# Patient Record
Sex: FEMALE | Race: WHITE | NOT HISPANIC OR LATINO | Employment: UNEMPLOYED | URBAN - METROPOLITAN AREA
[De-identification: names, ages, dates, MRNs, and addresses within clinical notes are randomized per-mention and may not be internally consistent; named-entity substitution may affect disease eponyms.]

---

## 2019-03-09 LAB
EXTERNAL HIV CONFIRMATION: NORMAL
EXTERNAL HIV SCREEN: NORMAL

## 2020-10-08 LAB — HCV AB SER-ACNC: NON REACTIVE

## 2024-04-23 ENCOUNTER — TELEPHONE (OUTPATIENT)
Age: 49
End: 2024-04-23

## 2024-04-23 NOTE — TELEPHONE ENCOUNTER
Patients  called and stated if there are any cancellations before Friday to please give them a call. Patients  stated patient would really like to be seen earlier.

## 2024-04-26 ENCOUNTER — OFFICE VISIT (OUTPATIENT)
Dept: FAMILY MEDICINE CLINIC | Facility: CLINIC | Age: 49
End: 2024-04-26
Payer: MEDICARE

## 2024-04-26 ENCOUNTER — TELEPHONE (OUTPATIENT)
Dept: ADMINISTRATIVE | Facility: OTHER | Age: 49
End: 2024-04-26

## 2024-04-26 VITALS
WEIGHT: 157.63 LBS | SYSTOLIC BLOOD PRESSURE: 125 MMHG | HEART RATE: 112 BPM | DIASTOLIC BLOOD PRESSURE: 71 MMHG | HEIGHT: 64 IN | TEMPERATURE: 97.1 F | BODY MASS INDEX: 26.91 KG/M2 | OXYGEN SATURATION: 100 %

## 2024-04-26 DIAGNOSIS — G47.419 PRIMARY NARCOLEPSY WITHOUT CATAPLEXY: Primary | ICD-10-CM

## 2024-04-26 DIAGNOSIS — E06.3 HYPOTHYROIDISM DUE TO HASHIMOTO'S THYROIDITIS: ICD-10-CM

## 2024-04-26 DIAGNOSIS — E03.8 HYPOTHYROIDISM DUE TO HASHIMOTO'S THYROIDITIS: ICD-10-CM

## 2024-04-26 DIAGNOSIS — E27.1 ADDISON'S DISEASE (HCC): ICD-10-CM

## 2024-04-26 DIAGNOSIS — M85.80 OSTEOPENIA, UNSPECIFIED LOCATION: ICD-10-CM

## 2024-04-26 DIAGNOSIS — E31.0 POLYGLANDULAR AUTOIMMUNE SYNDROME, TYPE 2 (HCC): ICD-10-CM

## 2024-04-26 DIAGNOSIS — Z86.718 HISTORY OF DEEP VEIN THROMBOSIS (DVT) OF LOWER EXTREMITY: ICD-10-CM

## 2024-04-26 DIAGNOSIS — E28.39 PRIMARY OVARIAN FAILURE: ICD-10-CM

## 2024-04-26 PROBLEM — L80 VITILIGO: Status: ACTIVE | Noted: 2024-04-26

## 2024-04-26 PROCEDURE — 99205 OFFICE O/P NEW HI 60 MIN: CPT | Performed by: FAMILY MEDICINE

## 2024-04-26 RX ORDER — VENLAFAXINE HYDROCHLORIDE 37.5 MG/1
37.5 CAPSULE, EXTENDED RELEASE ORAL DAILY
COMMUNITY

## 2024-04-26 RX ORDER — DEXTROAMPHETAMINE SACCHARATE, AMPHETAMINE ASPARTATE, DEXTROAMPHETAMINE SULFATE AND AMPHETAMINE SULFATE 2.5; 2.5; 2.5; 2.5 MG/1; MG/1; MG/1; MG/1
TABLET ORAL
COMMUNITY
Start: 2024-03-28 | End: 2024-04-26 | Stop reason: SDUPTHER

## 2024-04-26 RX ORDER — DEXTROAMPHETAMINE SACCHARATE, AMPHETAMINE ASPARTATE MONOHYDRATE, DEXTROAMPHETAMINE SULFATE AND AMPHETAMINE SULFATE 7.5; 7.5; 7.5; 7.5 MG/1; MG/1; MG/1; MG/1
30 CAPSULE, EXTENDED RELEASE ORAL DAILY
Qty: 30 CAPSULE | Refills: 0 | Status: SHIPPED | OUTPATIENT
Start: 2024-04-26

## 2024-04-26 RX ORDER — DEXTROAMPHETAMINE SACCHARATE, AMPHETAMINE ASPARTATE MONOHYDRATE, DEXTROAMPHETAMINE SULFATE AND AMPHETAMINE SULFATE 7.5; 7.5; 7.5; 7.5 MG/1; MG/1; MG/1; MG/1
CAPSULE, EXTENDED RELEASE ORAL
COMMUNITY
End: 2024-04-26 | Stop reason: SDUPTHER

## 2024-04-26 RX ORDER — LEVOTHYROXINE SODIUM 0.03 MG/1
88 TABLET ORAL DAILY
COMMUNITY

## 2024-04-26 RX ORDER — DEXTROAMPHETAMINE SACCHARATE, AMPHETAMINE ASPARTATE, DEXTROAMPHETAMINE SULFATE AND AMPHETAMINE SULFATE 2.5; 2.5; 2.5; 2.5 MG/1; MG/1; MG/1; MG/1
10 TABLET ORAL 3 TIMES DAILY PRN
Qty: 90 TABLET | Refills: 0 | Status: SHIPPED | OUTPATIENT
Start: 2024-04-26 | End: 2024-05-26

## 2024-04-26 RX ORDER — PREDNISONE 5 MG/1
TABLET ORAL DAILY
COMMUNITY

## 2024-04-26 RX ORDER — ASPIRIN 325 MG
325 TABLET, DELAYED RELEASE (ENTERIC COATED) ORAL EVERY 6 HOURS PRN
COMMUNITY

## 2024-04-26 RX ORDER — FLUDROCORTISONE ACETATE 0.1 MG/1
0.2 TABLET ORAL DAILY
COMMUNITY

## 2024-04-26 NOTE — ASSESSMENT & PLAN NOTE
Patient reports history of osteopenia - on review of DEXA, it looks like she has osteoporosis. Patient [place]ocrinology [place], we referred her today to est in this area. She may benefit from treatment or retesting at this time. Will follow up in 4 weeks to further discuss options if she does not already do so with endocrinology. Appreciate recs from endo as well.   DEXA scan 2018: T-score results:  AP spine (L1-L4)   -2.7       Femoral neck ( Left) -2.4    Total hip (Left) -2.4

## 2024-04-26 NOTE — ASSESSMENT & PLAN NOTE
Taking Adderall.  States has tried several other medications including Provigil, and Nuvigil in the past.  Ineffective. Symptoms stable currently.  Was following with pulm/sleep med in [place] and needs to est here. Referral placed. Chart review consistent with this history reported. UDS collected, CSA signed today and refill provided for adderall.

## 2024-04-26 NOTE — ASSESSMENT & PLAN NOTE
Chronic, stable  Currently on 88mcg Levothyroxine daily, continue  Was following with endocrinology in [place], referral placed

## 2024-04-26 NOTE — ASSESSMENT & PLAN NOTE
Documented h/o DVT with angioplasty and stent L groin-iliac. States hypercoagulability work up was completed and no underlying issues identified.  Thought to be related to an AVM and being on OCPs.  Declines referral currently. Was on coumadin in the past, but now managed on asa 325mg daily. Continue.

## 2024-04-26 NOTE — TELEPHONE ENCOUNTER
----- Message from Lakeshia Ochoa sent at 4/26/2024  9:29 AM EDT -----  Regarding: Care gap request  04/26/24 9:29 AM    Hello, our patient No patient name on file. has had Mammogram completed/performed. Please assist in updating the patient chart by pulling the Care Everywhere (CE) document. The date of service is 09/11/2023.     Thank you,  Lakeshia Ochoa  [facility] PRIMARY [facility]

## 2024-04-26 NOTE — PROGRESS NOTES
Name: Radha Weiss      : 1975      MRN: [id number]  Encounter Provider: Janette Valadez DO  Encounter Date: 2024   Encounter department: [facility] PRIMARY CARE    Assessment & Plan     1. Primary narcolepsy without cataplexy  Assessment & Plan:  Taking Adderall.  States has tried several other medications including Provigil, and Nuvigil in the past.  Ineffective. Symptoms stable currently.  [place] med [place] and needs to est here. Referral placed. Chart review consistent with this history reported. UDS collected, CSA signed today and refill provided for adderall.     Orders:  -     Ambulatory Referral to Sleep Medicine; Future  -     [facility] All Prescribed Meds and Special Instructions  -     Amphetamines, Methamphetamines  -     Butalbital  -     Phenobarbital  -     Secobarbital  -     Alprazolam  -     Clonazepam  -     Diazepam  -     Lorazepam  -     Gabapentin  -     Pregabalin  -     Cocaine  -     Heroin  -     Buprenorphine  -     Levorphanol  -     Meperidine  -     Naltrexone  -     Fentanyl  -     Methadone  -     Oxycodone  -     Tapentadol  -     THC  -     Tramadol  -     Codeine, Hydrocodone, Hydropmorphone, Morphine  -     Bath Salts  -     Ethyl Glucuronide/Ethyl Sulfate  -     Kratom  -     Spice  -     Methylphenidate  -     Phentermine  -     Validity Oxidant  -     Validity Creatinine  -     Validity pH  -     Validity Specific  -     Xylazine Definitive Test  -     amphetamine-dextroamphetamine (ADDERALL XR) 30 MG 24 hr capsule; Take 1 capsule (30 mg total) by mouth in the morning Max Daily Amount: 30 mg  -     amphetamine-dextroamphetamine (ADDERALL) 10 mg tablet; Take 1 tablet (10 mg total) by mouth 3 (three) times a day as needed (narcolepsy) Max Daily Amount: 30 mg    2. Horace's disease (HCC)  Assessment & Plan:  Patient was diagnosed at age 8. She is on Prednisone and Florinef. [place], and now needs to est with one here. Referral placed today.     Orders:  -     Ambulatory Referral to Endocrinology; Future    3. Hypothyroidism due to Hashimoto's thyroiditis  Assessment & Plan:  Chronic, stable  Currently on 88mcg Levothyroxine daily, continue  Was [place]ocrinology [place], referral placed      Orders:  -     Ambulatory Referral to Endocrinology; Future    4. Polyglandular autoimmune syndrome, type 2 (HCC)  -     Ambulatory Referral to Endocrinology; Future    5. Primary ovarian failure  -     Ambulatory Referral to Endocrinology; Future    6. Osteopenia, unspecified location  Assessment & Plan:  Patient reports history of osteopenia - on review of DEXA, it looks like she has osteoporosis. Patient was [place]ology [place], we referred her today to est in this area. She may benefit from treatment or retesting at this time. Will follow up in 4 weeks to further discuss options if she does not already do so with endocrinology. Appreciate recs from endo as well.   DEXA scan 2018: T-score results:  AP spine (L1-L4)   -2.7       Femoral neck ( Left) -2.4    Total hip (Left) -2.4       Orders:  -     Ambulatory Referral to Endocrinology; Future    7. History of deep vein thrombosis (DVT) of lower extremity  Assessment & Plan:  Documented h/o DVT with angioplasty and stent L groin-iliac. States hypercoagulability work up was completed and no underlying issues identified.  Thought to be related to an AVM and being on OCPs.  Declines referral currently. Was on coumadin in the past, but now managed on asa 325mg daily. Continue.            Return in about 4 weeks (around 5/24/2024) for AMW visit.    I have spent a total time of 65 minutes on 04/26/24 in caring for this patient including Risks and benefits of tx options, Instructions for management, Patient and family education, Importance of tx compliance, Counseling / Coordination of care, Documenting in the medical record,  Reviewing / ordering tests, medicine, procedures  , and Obtaining or reviewing history  .    Subjective      HPI    Chief Complaint   Patient presents with    [facility] Care     Patient recently moved here from [place].     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     Review of Systems   Constitutional:  Negative for appetite change, chills, diaphoresis, fever and unexpected weight change.   Eyes:  Negative for visual disturbance.   Respiratory:  Negative for shortness of breath.    Cardiovascular:  Negative for chest pain and leg swelling.   Gastrointestinal:  Negative for constipation and diarrhea.   Endocrine: Negative for polydipsia and polyuria.   Genitourinary:  Negative for frequency.   Neurological:  Negative for dizziness, light-headedness and headaches.       Current Outpatient Medications on File Prior to Visit   Medication Sig    aspirin (ECOTRIN) 325 mg EC tablet Take 325 mg by mouth every 6 (six) hours as needed for mild pain    fludrocortisone (FLORINEF) 0.1 mg tablet Take 0.2 mg by mouth daily    Hydrocortisone Sod Succinate (SOLU-CORTEF IJ) Inject as directed    levothyroxine 25 mcg tablet Take 88 mcg by mouth daily    predniSONE 5 mg tablet Take by mouth daily    [DISCONTINUED]  "amphetamine-dextroamphetamine (ADDERALL XR) 30 MG 24 hr capsule TAKE 1 CAPSULE (30 MG TOTAL) BY MOUTH ONCE DAILY EVERY MORNING.    [DISCONTINUED] amphetamine-dextroamphetamine (ADDERALL) 10 mg tablet Take by mouth       Objective     /71 (BP Location: Right arm, Patient Position: Sitting, Cuff Size: Standard)   Pulse (!) 112   Temp (!) 97.1 °F (36.2 °C) (Tympanic)   Ht 5' 4\" (1.626 m)   Wt 71.5 kg (157 lb 10.1 oz)   SpO2 100%   BMI 27.06 kg/m²       Physical Exam  Vitals reviewed.   Constitutional:       Appearance: She is normal weight.   HENT:      Head: Normocephalic and atraumatic.      Right Ear: External ear normal.      Left Ear: External ear normal.   Eyes:      Extraocular Movements: Extraocular movements intact.      Conjunctiva/sclera: Conjunctivae normal.   Neck:      Vascular: No carotid bruit.   Cardiovascular:      Rate and Rhythm: Normal rate and regular rhythm.      Pulses: Normal pulses.      Heart sounds: Normal heart sounds.   Pulmonary:      Effort: Pulmonary effort is normal.      Breath sounds: Normal breath sounds.   Abdominal:      General: Abdomen is flat.      Palpations: Abdomen is soft.   Musculoskeletal:      Right lower leg: No edema.      Left lower leg: No edema.   Neurological:      General: No focal deficit present.      Mental Status: She is alert.   Psychiatric:         Mood and Affect: Mood normal.         Behavior: Behavior normal.           Janette Valadez, DO    "

## 2024-04-26 NOTE — ASSESSMENT & PLAN NOTE
Patient was diagnosed at age 8. She is on Prednisone and Florinef. [place] endocrine [place], and now needs to est with one here. Referral placed today.

## 2024-04-26 NOTE — TELEPHONE ENCOUNTER
Upon review of the In Basket request we were able to locate, review, and update the patient chart as requested for Mammogram.    Any additional questions or concerns should be emailed to the Practice Liaisons via the appropriate education email address, please do not reply via In Basket.    Thank you  Hailee Hargrove MA

## 2024-04-29 LAB

## 2024-05-30 DIAGNOSIS — E27.1 ADDISON'S DISEASE (HCC): Primary | ICD-10-CM

## 2024-05-30 DIAGNOSIS — G47.419 PRIMARY NARCOLEPSY WITHOUT CATAPLEXY: ICD-10-CM

## 2024-05-30 RX ORDER — FLUDROCORTISONE ACETATE 0.1 MG/1
0.2 TABLET ORAL DAILY
Qty: 180 TABLET | Refills: 0 | Status: SHIPPED | OUTPATIENT
Start: 2024-05-30

## 2024-05-30 RX ORDER — DEXTROAMPHETAMINE SACCHARATE, AMPHETAMINE ASPARTATE MONOHYDRATE, DEXTROAMPHETAMINE SULFATE AND AMPHETAMINE SULFATE 7.5; 7.5; 7.5; 7.5 MG/1; MG/1; MG/1; MG/1
30 CAPSULE, EXTENDED RELEASE ORAL DAILY
Qty: 30 CAPSULE | Refills: 0 | Status: SHIPPED | OUTPATIENT
Start: 2024-05-30

## 2024-05-30 NOTE — TELEPHONE ENCOUNTER
Reason for call:   [x] Refill   [] Prior Auth  [] Other:     Office:   [x] PCP/Provider -   [] Specialty/Provider -     Medication:         Does the patient have enough for 3 days?   [] Yes   [x] No - Send as HP to POD    Pt  asking for call back once medications are sent    no

## 2024-06-19 DIAGNOSIS — G47.419 PRIMARY NARCOLEPSY WITHOUT CATAPLEXY: ICD-10-CM

## 2024-06-19 RX ORDER — DEXTROAMPHETAMINE SACCHARATE, AMPHETAMINE ASPARTATE, DEXTROAMPHETAMINE SULFATE AND AMPHETAMINE SULFATE 2.5; 2.5; 2.5; 2.5 MG/1; MG/1; MG/1; MG/1
10 TABLET ORAL 3 TIMES DAILY PRN
Qty: 90 TABLET | Refills: 0 | Status: SHIPPED | OUTPATIENT
Start: 2024-06-19 | End: 2024-06-28 | Stop reason: SDUPTHER

## 2024-06-19 NOTE — TELEPHONE ENCOUNTER
Reason for call:   [x] Refill   [] Prior Auth  [] Other:     Office:   [x] PCP/Provider -   [] Specialty/Provider -     Medication: amphetamine-dextroamphetamine (ADDERALL) 10 mg tablet Take 1 tablet (10 mg total) by mouth 3 (three) times a day as needed       Pharmacy: [facility]/pharmacy #[id number] - [place] - [address]      Does the patient have enough for 3 days?   [x] Yes   [] No - Send as HP to POD

## 2024-06-20 ENCOUNTER — TELEPHONE (OUTPATIENT)
Dept: FAMILY MEDICINE CLINIC | Facility: CLINIC | Age: 49
End: 2024-06-20

## 2024-06-20 DIAGNOSIS — E27.1 ADDISON'S DISEASE (HCC): ICD-10-CM

## 2024-06-20 RX ORDER — FLUDROCORTISONE ACETATE 0.1 MG/1
0.2 TABLET ORAL DAILY
Qty: 180 TABLET | Refills: 0 | OUTPATIENT
Start: 2024-06-20

## 2024-06-20 NOTE — TELEPHONE ENCOUNTER
Reason for call:   [x] Refill   [] Prior Auth  [] Other:     Office:   [x] PCP/Provider -  Rory  [] Specialty/Provider -         Does the patient have enough for 3 days?   [] Yes   [x] No - Send as HP to POD

## 2024-06-20 NOTE — TELEPHONE ENCOUNTER
[facility] contacted notifying need for prior auth for Adderall 10MG tablets.     Adderall 10mg tablets  Take 1 tablet PO TID PRN (narcolepsy)  Max Daily Amount: 30mg  Diagnosis: G47.419 Narcolepsy without cataplexy

## 2024-06-21 DIAGNOSIS — E27.1 ADDISON'S DISEASE (HCC): ICD-10-CM

## 2024-06-24 ENCOUNTER — CONSULT (OUTPATIENT)
Dept: ENDOCRINOLOGY | Facility: CLINIC | Age: 49
End: 2024-06-24
Payer: MEDICARE

## 2024-06-24 ENCOUNTER — RA CDI HCC (OUTPATIENT)
Dept: OTHER | Facility: HOSPITAL | Age: 49
End: 2024-06-24

## 2024-06-24 VITALS
BODY MASS INDEX: 25.78 KG/M2 | SYSTOLIC BLOOD PRESSURE: 110 MMHG | DIASTOLIC BLOOD PRESSURE: 60 MMHG | HEART RATE: 95 BPM | WEIGHT: 151 LBS | HEIGHT: 64 IN

## 2024-06-24 DIAGNOSIS — E03.8 HYPOTHYROIDISM DUE TO HASHIMOTO'S THYROIDITIS: ICD-10-CM

## 2024-06-24 DIAGNOSIS — Z86.718 HISTORY OF DEEP VEIN THROMBOSIS (DVT) OF LOWER EXTREMITY: ICD-10-CM

## 2024-06-24 DIAGNOSIS — E28.39 PRIMARY OVARIAN FAILURE: ICD-10-CM

## 2024-06-24 DIAGNOSIS — E31.0 POLYGLANDULAR AUTOIMMUNE SYNDROME, TYPE 2 (HCC): ICD-10-CM

## 2024-06-24 DIAGNOSIS — M85.80 OSTEOPENIA, UNSPECIFIED LOCATION: ICD-10-CM

## 2024-06-24 DIAGNOSIS — E06.3 HYPOTHYROIDISM DUE TO HASHIMOTO'S THYROIDITIS: ICD-10-CM

## 2024-06-24 DIAGNOSIS — E27.1 ADDISON'S DISEASE (HCC): Primary | ICD-10-CM

## 2024-06-24 PROCEDURE — 99204 OFFICE O/P NEW MOD 45 MIN: CPT | Performed by: STUDENT IN AN ORGANIZED HEALTH CARE EDUCATION/TRAINING PROGRAM

## 2024-06-24 NOTE — ASSESSMENT & PLAN NOTE
There is a history of DVT on OCP. Patient has been referred to ob/gyn. I would like to follow up with records from prior endocrinologist regarding BMD testing. Patient is at high risk for metabolic bone disease.

## 2024-06-24 NOTE — PROGRESS NOTES
Radha Weiss 48 y.o. female MRN: [id number]    Encounter: [id number]      Assessment & Plan     Problem List Items Addressed This Visit     Marsteller's disease (HCC) - Primary     Continue present Rx prednisone and fludrocortisone. Labs today for BMP for na/k. Principles of stress dosing reviewed. Patient has E-steroid at home. We talked about importance of medical alert bracelet.          Relevant Orders    Comprehensive metabolic panel    Primary ovarian failure     There is a history of DVT on OCP. Patient has been referred to ob/gyn. I would like to follow up with records from prior endocrinologist regarding BMD testing. Patient is at high risk for metabolic bone disease.          Polyglandular autoimmune syndrome, type 2 (HCC)    Osteopenia    History of deep vein thrombosis (DVT) of lower extremity    Hypothyroidism due to Hashimoto's thyroiditis    Relevant Orders    TSH, 3rd generation    T4, free     RTC 6-mo    CC: Polyglandular syndrome type 2    History of Present Illness     HPI:    Radha presents for evaluation of APS2, c/b lan's dz (dx age 8y), hypothyroidism (dx age 14y), and premature ovarian failure.     She is transferring care from Dr. Amy Millard in [place], [place].     For AI, patient takes prednisone 5 mg daily and florinef 0.2 mg daily. She reports no abd pain, loss of appetite, nausea, lightheadedness, lower extremity swelling. She is familiar with sick day rules and stress dosing, and has emergency steroid. She does not have a medical alert bracelet. She reports several episodes of crisis, last 2 years ago.     For hypothyroidism she takes LT4 88 mcg daily. She takes levothyroxine consistently and as prescribed. She is without thyroidal symptoms.     For ovarian insufficiency, she is not on therapy. She had a dvt 4-years ago on estrogen. She takes venlafaxine for hot flashes. She denies fracture history.     She does report history of narcolepsy.     Review of Systems    Constitutional:  Negative for diaphoresis and unexpected weight change.   HENT:  Negative for trouble swallowing and voice change.    Eyes:  Negative for visual disturbance.   Cardiovascular:  Negative for palpitations.   Gastrointestinal:  Negative for nausea and vomiting.   Endocrine: Negative for heat intolerance.   Neurological:  Negative for tremors.   All other systems reviewed and are negative.      Historical Information   Past Medical History:   Diagnosis Date   • Traill's disease (HCC)    • Hypothyroidism    • Lyme disease    • Premature ovarian failure      Past Surgical History:   Procedure Laterality Date   • IR DVT THROMBOLYSIS/THROMBECTOMY ILIAC/IVC WITH VENOGRAM       Social History   Social History     Substance and Sexual Activity   Alcohol Use Not Currently     Social History     Substance and Sexual Activity   Drug Use Not Currently     Social History     Tobacco Use   Smoking Status Never   Smokeless Tobacco Never     Family History: History reviewed. No pertinent family history.    Meds/Allergies   Current Outpatient Medications   Medication Sig Dispense Refill   • amphetamine-dextroamphetamine (ADDERALL XR) 30 MG 24 hr capsule Take 1 capsule (30 mg total) by mouth in the morning Max Daily Amount: 30 mg 30 capsule 0   • amphetamine-dextroamphetamine (ADDERALL) 10 mg tablet Take 1 tablet (10 mg total) by mouth 3 (three) times a day as needed (narcolepsy) Max Daily Amount: 30 mg 90 tablet 0   • aspirin (ECOTRIN) 325 mg EC tablet Take 325 mg by mouth every 6 (six) hours as needed for mild pain     • fludrocortisone (FLORINEF) 0.1 mg tablet Take 2 tablets (0.2 mg total) by mouth daily 180 tablet 0   • levothyroxine 88 mcg tablet Take 88 mcg by mouth daily     • predniSONE 5 mg tablet Take by mouth daily     • venlafaxine (EFFEXOR-XR) 37.5 mg 24 hr capsule Take 37.5 mg by mouth daily     • Hydrocortisone Sod Succinate (SOLU-CORTEF IJ) Inject as directed (Patient not taking: Reported on 6/24/2024)  "      No current facility-administered medications for this visit.     Allergies   Allergen Reactions   • Morphine Nausea Only     Derivatives-Dizziness/Nausea, pt cant take oral meds, she is ok with injectables if needed   • Oxycodone-Acetaminophen Dizziness and Nausea Only     Dizziness/lightheaded, nausea only- allergy to pill form, may take injections   • Sulfa Antibiotics Rash     Swelling-hands/face       Objective   Vitals: Blood pressure 110/60, pulse 95, height 5' 4\" (1.626 m), weight 68.5 kg (151 lb).    Physical Exam  Vitals reviewed.   Constitutional:       General: She is not in acute distress.     Appearance: Normal appearance.   HENT:      Head: Normocephalic and atraumatic.      Nose: Nose normal.   Eyes:      General: No scleral icterus.     Conjunctiva/sclera: Conjunctivae normal.   Pulmonary:      Effort: Pulmonary effort is normal. No respiratory distress.   Musculoskeletal:         General: No deformity.      Cervical back: Normal range of motion.   Skin:     General: Skin is warm and dry.   Neurological:      Mental Status: She is alert.   Psychiatric:         Mood and Affect: Mood normal.         Behavior: Behavior normal.         The history was obtained from the review of the chart, patient.    Lab Results:        UNAVAILABLE    Imaging Studies:         I have personally reviewed pertinent reports.      Portions of the record may have been created with voice recognition software. Occasional wrong word or \"sound a like\" substitutions may have occurred due to the inherent limitations of voice recognition software. Read the chart carefully and recognize, using context, where substitutions have occurred.    "

## 2024-06-24 NOTE — ASSESSMENT & PLAN NOTE
Continue present Rx prednisone and fludrocortisone. Labs today for BMP for na/k. Principles of stress dosing reviewed. Patient has E-steroid at home. We talked about importance of medical alert bracelet.

## 2024-06-26 RX ORDER — FLUDROCORTISONE ACETATE 0.1 MG/1
0.2 TABLET ORAL DAILY
Qty: 180 TABLET | Refills: 0 | Status: SHIPPED | OUTPATIENT
Start: 2024-06-26

## 2024-06-28 ENCOUNTER — TELEPHONE (OUTPATIENT)
Dept: ADMINISTRATIVE | Facility: OTHER | Age: 49
End: 2024-06-28

## 2024-06-28 DIAGNOSIS — G47.419 PRIMARY NARCOLEPSY WITHOUT CATAPLEXY: ICD-10-CM

## 2024-06-28 RX ORDER — DEXTROAMPHETAMINE SACCHARATE, AMPHETAMINE ASPARTATE, DEXTROAMPHETAMINE SULFATE AND AMPHETAMINE SULFATE 2.5; 2.5; 2.5; 2.5 MG/1; MG/1; MG/1; MG/1
10 TABLET ORAL 3 TIMES DAILY PRN
Qty: 90 TABLET | Refills: 0 | Status: SHIPPED | OUTPATIENT
Start: 2024-06-28 | End: 2024-07-28

## 2024-06-28 RX ORDER — DEXTROAMPHETAMINE SACCHARATE, AMPHETAMINE ASPARTATE MONOHYDRATE, DEXTROAMPHETAMINE SULFATE AND AMPHETAMINE SULFATE 7.5; 7.5; 7.5; 7.5 MG/1; MG/1; MG/1; MG/1
30 CAPSULE, EXTENDED RELEASE ORAL DAILY
Qty: 30 CAPSULE | Refills: 0 | Status: SHIPPED | OUTPATIENT
Start: 2024-06-28

## 2024-06-28 NOTE — TELEPHONE ENCOUNTER
Upon review of the In Basket request we were able to locate, review, and update the patient chart as requested for Hepatitis C .    Any additional questions or concerns should be emailed to the Practice Liaisons via the appropriate education email address, please do not reply via In Basket.    Thank you  Hailee Hargrove MA   [facility]

## 2024-06-28 NOTE — TELEPHONE ENCOUNTER
----- Message from Tracy MUNOZ sent at 6/28/2024  8:59 AM EDT -----  Regarding: quality metric update  06/28/24 8:59 AM    Hello, our patient above has had Hepatitis C completed/performed. Please assist in updating the patient chart by pulling the Care Everywhere (CE) document. It is listed as VIRAL TESTS. The date of service is 10/08/2020.     Thank you,  Tracy Cortes  [facility] PRIMARY [facility]

## 2024-06-30 NOTE — TELEPHONE ENCOUNTER
Medication dose and instruction change from Adderall 10 mg three times a day to Adderall 30 mg 24 hrs capsule. If a prior authorization still needed please sent this message back to the prior authorization team. Thank you.

## 2024-07-01 ENCOUNTER — OFFICE VISIT (OUTPATIENT)
Dept: FAMILY MEDICINE CLINIC | Facility: CLINIC | Age: 49
End: 2024-07-01
Payer: MEDICARE

## 2024-07-01 VITALS
SYSTOLIC BLOOD PRESSURE: 118 MMHG | HEART RATE: 74 BPM | BODY MASS INDEX: 25.96 KG/M2 | WEIGHT: 151.24 LBS | OXYGEN SATURATION: 100 % | DIASTOLIC BLOOD PRESSURE: 74 MMHG

## 2024-07-01 DIAGNOSIS — Z00.00 MEDICARE ANNUAL WELLNESS VISIT, SUBSEQUENT: Primary | ICD-10-CM

## 2024-07-01 DIAGNOSIS — Z12.31 ENCOUNTER FOR SCREENING MAMMOGRAM FOR BREAST CANCER: ICD-10-CM

## 2024-07-01 DIAGNOSIS — Z12.11 SCREENING FOR COLON CANCER: ICD-10-CM

## 2024-07-01 DIAGNOSIS — Z78.0 POSTMENOPAUSAL: ICD-10-CM

## 2024-07-01 DIAGNOSIS — M85.80 OSTEOPENIA, UNSPECIFIED LOCATION: ICD-10-CM

## 2024-07-01 PROCEDURE — G0439 PPPS, SUBSEQ VISIT: HCPCS | Performed by: FAMILY MEDICINE

## 2024-07-01 NOTE — PATIENT INSTRUCTIONS
Medicare Preventive Visit Patient Instructions  Thank you for completing your Welcome to Medicare Visit or Medicare Annual Wellness Visit today. Your next wellness visit will be due in one year (7/2/2025).  The screening/preventive services that you may require over the next 5-10 years are detailed below. Some tests may not apply to you based off risk factors and/or age. Screening tests ordered at today's visit but not completed yet may show as past due. Also, please note that scanned in results may not display below.  Preventive Screenings:  Service Recommendations Previous Testing/Comments   Colorectal Cancer Screening  * Colonoscopy    * Fecal Occult Blood Test (FOBT)/Fecal Immunochemical Test (FIT)  * Fecal DNA/Cologuard Test  * Flexible Sigmoidoscopy Age: 45-75 years old   Colonoscopy: every 10 years (may be performed more frequently if at higher risk)  OR  FOBT/FIT: every 1 year  OR  Cologuard: every 3 years  OR  Sigmoidoscopy: every 5 years  Screening may be recommended earlier than age 45 if at higher risk for colorectal cancer. Also, an individualized decision between you and your healthcare provider will decide whether screening between the ages of 76-85 would be appropriate. Colonoscopy: Not on file  FOBT/FIT: Not on file  Cologuard: Not on file  Sigmoidoscopy: Not on file          Breast Cancer Screening Age: 40+ years old  Frequency: every 1-2 years  Not required if history of left and right mastectomy Mammogram: 09/11/2023    Screening Current   Cervical Cancer Screening Between the ages of 21-29, pap smear recommended once every 3 years.   Between the ages of 30-65, can perform pap smear with HPV co-testing every 5 years.   Recommendations may differ for women with a history of total hysterectomy, cervical cancer, or abnormal pap smears in past. Pap Smear: Not on file        Hepatitis C Screening Once for adults born between 1945 and 1965  More frequently in patients at high risk for Hepatitis C Hep C  Antibody: 10/08/2020    Screening Current   Diabetes Screening 1-2 times per year if you're at risk for diabetes or have pre-diabetes Fasting glucose: No results in last 5 years (No results in last 5 years)  A1C: No results in last 5 years (No results in last 5 years)      Cholesterol Screening Once every 5 years if you don't have a lipid disorder. May order more often based on risk factors. Lipid panel: Not on file          Other Preventive Screenings Covered by Medicare:  Abdominal Aortic Aneurysm (AAA) Screening: covered once if your at risk. You're considered to be at risk if you have a family history of AAA.  Lung Cancer Screening: covers low dose CT scan once per year if you meet all of the following conditions: (1) Age 55-77; (2) No signs or symptoms of lung cancer; (3) Current smoker or have quit smoking within the last 15 years; (4) You have a tobacco smoking history of at least 20 pack years (packs per day multiplied by number of years you smoked); (5) You get a written order from a healthcare provider.  Glaucoma Screening: covered annually if you're considered high risk: (1) You have diabetes OR (2) Family history of glaucoma OR (3)  aged 50 and older OR (4)  American aged 65 and older  Osteoporosis Screening: covered every 2 years if you meet one of the following conditions: (1) You're estrogen deficient and at risk for osteoporosis based off medical history and other findings; (2) Have a vertebral abnormality; (3) On glucocorticoid therapy for more than 3 months; (4) Have primary hyperparathyroidism; (5) On osteoporosis medications and need to assess response to drug therapy.   Last bone density test (DXA Scan): Not on file.  HIV Screening: covered annually if you're between the age of 15-65. Also covered annually if you are younger than 15 and older than 65 with risk factors for HIV infection. For pregnant patients, it is covered up to 3 times per  pregnancy.    Immunizations:  Immunization Recommendations   Influenza Vaccine Annual influenza vaccination during flu season is recommended for all persons aged >= 6 months who do not have contraindications   Pneumococcal Vaccine   * Pneumococcal conjugate vaccine = PCV13 (Prevnar 13), PCV15 (Vaxneuvance), PCV20 (Prevnar 20)  * Pneumococcal polysaccharide vaccine = PPSV23 (Pneumovax) Adults 19-65 yo with certain risk factors or if 65+ yo  If never received any pneumonia vaccine: recommend Prevnar 20 (PCV20)  Give PCV20 if previously received 1 dose of PCV13 or PPSV23   Hepatitis B Vaccine 3 dose series if at intermediate or high risk (ex: diabetes, end stage renal disease, liver disease)   Respiratory syncytial virus (RSV) Vaccine - COVERED BY MEDICARE PART D  * RSVPreF3 (Arexvy) CDC recommends that adults 60 years of age and older may receive a single dose of RSV vaccine using shared clinical decision-making (SCDM)   Tetanus (Td) Vaccine - COST NOT COVERED BY MEDICARE PART B Following completion of primary series, a booster dose should be given every 10 years to maintain immunity against tetanus. Td may also be given as tetanus wound prophylaxis.   Tdap Vaccine - COST NOT COVERED BY MEDICARE PART B Recommended at least once for all adults. For pregnant patients, recommended with each pregnancy.   Shingles Vaccine (Shingrix) - COST NOT COVERED BY MEDICARE PART B  2 shot series recommended in those 19 years and older who have or will have weakened immune systems or those 50 years and older     Health Maintenance Due:      Topic Date Due   • HIV Screening  Never done   • Cervical Cancer Screening  Never done   • Colorectal Cancer Screening  Never done   • Breast Cancer Screening: Mammogram  09/11/2024   • Hepatitis C Screening  Completed     Immunizations Due:      Topic Date Due   • COVID-19 Vaccine (3 - 2023-24 season) 09/01/2023   • Influenza Vaccine (1) 09/01/2024     Advance Directives   What are advance  directives?  Advance directives are legal documents that state your wishes and plans for medical care. These plans are made ahead of time in case you lose your ability to make decisions for yourself. Advance directives can apply to any medical decision, such as the treatments you want, and if you want to donate organs.   What are the types of advance directives?  There are many types of advance directives, and each state has rules about how to use them. You may choose a combination of any of the following:  Living will:  This is a written record of the treatment you want. You can also choose which treatments you do not want, which to limit, and which to stop at a certain time. This includes surgery, medicine, IV fluid, and tube feedings.   Durable power of  for healthcare (DPAHC):  This is a written record that states who you want to make healthcare choices for you when you are unable to make them for yourself. This person, called a proxy, is usually a family member or a friend. You may choose more than 1 proxy.  Do not resuscitate (DNR) order:  A DNR order is used in case your heart stops beating or you stop breathing. It is a request not to have certain forms of treatment, such as CPR. A DNR order may be included in other types of advance directives.  Medical directive:  This covers the care that you want if you are in a coma, near death, or unable to make decisions for yourself. You can list the treatments you want for each condition. Treatment may include pain medicine, surgery, blood transfusions, dialysis, IV or tube feedings, and a ventilator (breathing machine).  Values history:  This document has questions about your views, beliefs, and how you feel and think about life. This information can help others choose the care that you would choose.  Why are advance directives important?  An advance directive helps you control your care. Although spoken wishes may be used, it is better to have your wishes  written down. Spoken wishes can be misunderstood, or not followed. Treatments may be given even if you do not want them. An advance directive may make it easier for your family to make difficult choices about your care.   Weight Management   Why it is important to manage your weight:  Being overweight increases your risk of health conditions such as heart disease, high blood pressure, type 2 diabetes, and certain types of cancer. It can also increase your risk for osteoarthritis, sleep apnea, and other respiratory problems. Aim for a slow, steady weight loss. Even a small amount of weight loss can lower your risk of health problems.  How to lose weight safely:  A safe and healthy way to lose weight is to eat fewer calories and get regular exercise. You can lose up about 1 pound a week by decreasing the number of calories you eat by 500 calories each day.   Healthy meal plan for weight management:  A healthy meal plan includes a variety of foods, contains fewer calories, and helps you stay healthy. A healthy meal plan includes the following:  Eat whole-grain foods more often.  A healthy meal plan should contain fiber. Fiber is the part of grains, fruits, and vegetables that is not broken down by your body. Whole-grain foods are healthy and provide extra fiber in your diet. Some examples of whole-grain foods are whole-wheat breads and pastas, oatmeal, brown rice, and bulgur.  Eat a variety of vegetables every day.  Include dark, leafy greens such as spinach, kale, irvin greens, and mustard greens. Eat yellow and orange vegetables such as carrots, sweet potatoes, and winter squash.   Eat a variety of fruits every day.  Choose fresh or canned fruit (canned in its own juice or light syrup) instead of juice. Fruit juice has very little or no fiber.  Eat low-fat dairy foods.  Drink fat-free (skim) milk or 1% milk. Eat fat-free yogurt and low-fat cottage cheese. Try low-fat cheeses such as mozzarella and other reduced-fat  cheeses.  Choose meat and other protein foods that are low in fat.  Choose beans or other legumes such as split peas or lentils. Choose fish, skinless poultry (chicken or turkey), or lean cuts of red meat (beef or pork). Before you cook meat or poultry, cut off any visible fat.   Use less fat and oil.  Try baking foods instead of frying them. Add less fat, such as margarine, sour cream, regular salad dressing and mayonnaise to foods. Eat fewer high-fat foods. Some examples of high-fat foods include french fries, doughnuts, ice cream, and cakes.  Eat fewer sweets.  Limit foods and drinks that are high in sugar. This includes candy, cookies, regular soda, and sweetened drinks.  Exercise:  Exercise at least 30 minutes per day on most days of the week. Some examples of exercise include walking, biking, dancing, and swimming. You can also fit in more physical activity by taking the stairs instead of the elevator or parking farther away from stores. Ask your healthcare provider about the best exercise plan for you.   Narcotic (Opioid) Safety    Use narcotics safely:  Take prescribed narcotics exactly as directed  Do not give narcotics to others or take narcotics that belong to someone else  Do not mix narcotics without medicines or alcohol  Do not drive or operate heavy machinery after you take the narcotic  Monitor for side effects and notify your healthcare provider if you experienced side effects such as nausea, sleepiness, itching, or trouble thinking clearly.    Manage constipation:    Constipation is the most common side effect of narcotic medicine. Constipation is when you have hard, dry bowel movements, or you go longer than usual between bowel movements. Tell your healthcare provider about all changes in your bowel movements while you are taking narcotics. He or she may recommend laxative medicine to help you have a bowel movement. He or she may also change the kind of narcotic you are taking, or change when you  take it. The following are more ways you can prevent or relieve constipation:    Drink liquids as directed.  You may need to drink extra liquids to help soften and move your bowels. Ask how much liquid to drink each day and which liquids are best for you.  Eat high-fiber foods.  This may help decrease constipation by adding bulk to your bowel movements. High-fiber foods include fruits, vegetables, whole-grain breads and cereals, and beans. Your healthcare provider or dietitian can help you create a high-fiber meal plan. Your provider may also recommend a fiber supplement if you cannot get enough fiber from food.  Exercise regularly.  Regular physical activity can help stimulate your intestines. Walking is a good exercise to prevent or relieve constipation. Ask which exercises are best for you.  Schedule a time each day to have a bowel movement.  This may help train your body to have regular bowel movements. Bend forward while you are on the toilet to help move the bowel movement out. Sit on the toilet for at least 10 minutes, even if you do not have a bowel movement.    Store narcotics safely:   Store narcotics where others cannot easily get them.  Keep them in a locked cabinet or secure area. Do not  keep them in a purse or other bag you carry with you. A person may be looking for something else and find the narcotics.  Make sure narcotics are stored out of the reach of children.  A child can easily overdose on narcotics. Narcotics may look like candy to a small child.    The best way to dispose of narcotics:      The laws vary by country and area. In the [place], the best way is to return the narcotics through a take-back program. This program is offered by the [place] Drug Enforcement Agency (SYLVIE). The following are options for using the program:  Take the narcotics to a SYLVIE collection site.  The site is often a law enforcement center. Call your local law enforcement center for scheduled take-back days in your  area. You will be given information on where to go if the collection site is in a different location.  Take the narcotics to an approved pharmacy or hospital.  A pharmacy or hospital may be set up as a collection site. You will need to ask if it is a [employer]LVIE collection site if you were not directed there. A pharmacy or doctor's office may not be able to take back narcotics unless it is a SYLVIE site.  Use a mail-back system.  This means you are given containers to put the narcotics into. You will then mail them in the containers.  Use a take-back drop box.  This is a place to leave the narcotics at any time. People and animals will not be able to get into the box. Your local law enforcement agency can tell you where to find a drop box in your area.    Other ways to manage pain:   Ask your healthcare provider about non-narcotic medicines to control pain.  Nonprescription medicines include NSAIDs (such as ibuprofen) and acetaminophen. Prescription medicines include muscle relaxers, antidepressants, and steroids.  Pain may be managed without any medicines.  Some ways to relieve pain include massage, aromatherapy, or meditation. Physical or occupational therapy may also help.    For more information:   Drug Enforcement Administration  [address] [place] [address]  Phone: 2- 064 - 744-7468  Web Address: https://www.deadiversion.[facility].gov/drug_disposal/    US Food and Drug Administration  [address] [place] [address]  Phone: 4- 009 - 746-6391  Web Address: http://www.fda.gov     © Copyright [employer] 2018 Information is for End User's use only and may not be sold, redistributed or otherwise used for commercial purposes. All illustrations and images included in CareNotes® are the copyrighted property of [employer] or [employer]

## 2024-07-01 NOTE — PROGRESS NOTES
Ambulatory Visit  Name: Radha Weiss      : 1975      MRN: [id number]  Encounter Provider: Janette Valadez DO  Encounter Date: 2024   Encounter department: [facility]    Assessment & Plan   1. Medicare annual wellness visit, subsequent  2. Encounter for screening mammogram for breast cancer  -     Mammo screening bilateral w 3d & cad; Future; Expected date: 2024  3. Screening for colon cancer  -     Ambulatory Referral to Gastroenterology; Future  4. Osteopenia, unspecified location  -     DXA bone density spine hip and pelvis; Future; Expected date: 2024  5. Postmenopausal  -     DXA bone density spine hip and pelvis; Future; Expected date: 2024       Return in about 6 months (around 2025) for Recheck chronic health conditions .    Preventive health issues were discussed with patient, and age appropriate screening tests were ordered as noted in patient's After Visit Summary. Personalized health advice and appropriate referrals for health education or preventive services given if needed, as noted in patient's After Visit Summary.    History of Present Illness     HPI   Patient Care Team:  Janette Valadez DO as PCP - General (Family Medicine)  Guzman Fabian DO (Endocrinology)  No acute concerns today    Review of Systems   Constitutional:  Negative for appetite change, chills, diaphoresis, fever and unexpected weight change.   Eyes:  Negative for visual disturbance.   Respiratory:  Negative for shortness of breath.    Cardiovascular:  Negative for chest pain and leg swelling.   Gastrointestinal:  Negative for constipation and diarrhea.   Endocrine: Negative for polydipsia and polyuria.   Genitourinary:  Negative for frequency.   Neurological:  Negative for dizziness, light-headedness and headaches.     Medical History Reviewed by provider this encounter:  Tobacco  Allergies  Meds  Problems  Med Hx  Surg Hx  Fam Hx      Kapvay (extended release clonidine) 0.1 mg:  Increase to 1 tablet in the morning and 2 tablets at bedtime,  After 2 weeks, if this is not effective enough for the school day, you can increase to 1 tablet in the morning and 3 tablets at bedtime (with potential to change to 2 tablets in the morning and 2 tablets at bedtime if the morning dosage is not too sedating)    Lamotrigine 100 mg:  Increase to 1 tablet a day (this can be taken at night instead of the morning if preferable)  for 1--2 weeks,  If his moods are not much more stable, increase to 1& 1/2 tablets once a day for another 1-2 weeks,  If necessary you could increase to 2 tablets once a day    (If you do not notice any changes or improvement after 2 weeks at any dosage, you may want to consider increasing the dosage, even though full benefit may take longer)     Annual Wellness Visit Questionnaire       Health Risk Assessment:   Patient rates overall health as good. Patient feels that their physical health rating is same. Patient is satisfied with their life. Hearing was rated as same. Patient feels that their emotional and mental health rating is same. Patients states they are sometimes angry. Patient states they are always unusually tired/fatigued. Pain experienced in the last 7 days has been none. Patient states that she has experienced no weight loss or gain in last 6 months.     Fall Risk Screening:   In the past year, patient has experienced: no history of falling in past year      Urinary Incontinence Screening:   Patient has not leaked urine accidently in the last six months.     Home Safety:  Patient does not have trouble with stairs inside or outside of their home. Patient has working smoke alarms and has working carbon monoxide detector. Home safety hazards include: none.     Nutrition:   Current diet is Regular.     Medications:   Patient is currently taking over-the-counter supplements. OTC medications include: see medication list. Patient is able to manage medications.     Activities of Daily Living (ADLs)/Instrumental Activities of Daily Living (IADLs):   Walk and transfer into and out of bed and chair?: Yes  Dress and groom yourself?: Yes    Bathe or shower yourself?: Yes    Feed yourself? Yes  Do your laundry/housekeeping?: Yes  Manage your money, pay your bills and track your expenses?: Yes  Make your own meals?: Yes    Do your own shopping?: Yes    Previous Hospitalizations:   Any hospitalizations or ED visits within the last 12 months?: Yes    How many hospitalizations have you had in the last year?: 1-2    Hospitalization Comments: Adrenal crisis, now well controlled and following with endo.     Advance Care Planning:   Living will: No    Durable POA for healthcare: No    Advanced directive: No    Advanced directive counseling given: No    Five wishes  given: No    Patient declined ACP directive: No    End of Life Decisions reviewed with patient: No    Provider agrees with end of life decisions: No      Cognitive Screening:   Provider or family/friend/caregiver concerned regarding cognition?: No    PREVENTIVE SCREENINGS      Cardiovascular Screening:    General: Screening Current      Diabetes Screening:     General: Screening Current      Colorectal Cancer Screening:       Due for: Colonoscopy - Low Risk      Breast Cancer Screening:     General: Screening Current      Cervical Cancer Screening:    General: Screening Current      Lung Cancer Screening:     General: Screening Not Indicated      Hepatitis C Screening:    General: Screening Current    Screening, Brief Intervention, and Referral to Treatment (SBIRT)    Screening  Typical number of drinks in a day: 0  Typical number of drinks in a week: 0  Interpretation: Low risk drinking behavior.    Single Item Drug Screening:  How often have you used an illegal drug (including marijuana) or a prescription medication for non-medical reasons in the past year? never    Single Item Drug Screen Score: 0  Interpretation: Negative screen for possible drug use disorder    Review of Current Opioid Use    Opioid Risk Tool (ORT) Interpretation: Complete Opioid Risk Tool (ORT)    Social Determinants of Health     Food Insecurity: No Food Insecurity (7/1/2024)    Hunger Vital Sign     Worried About Running Out of Food in the Last Year: Never true     Ran Out of Food in the Last Year: Never true   Transportation Needs: No Transportation Needs (7/1/2024)    PRAPARE - Transportation     Lack of Transportation (Medical): No     Lack of Transportation (Non-Medical): No   Housing Stability: Low Risk  (7/1/2024)    Housing Stability Vital Sign     Unable to Pay for Housing in the Last Year: No     Number of Times Moved in the Last Year: 1     Homeless in the Last Year: No   Utilities: Not At Risk (7/1/2024)    [facility] Utilities      Threatened with loss of utilities: No     No results found.    Objective     /74 (BP Location: Right arm, Patient Position: Sitting, Cuff Size: Standard)   Pulse 74   Wt 68.6 kg (151 lb 3.8 oz)   SpO2 100%   BMI 25.96 kg/m²     Physical Exam  Vitals reviewed.   Constitutional:       General: She is not in acute distress.     Appearance: She is well-developed and normal weight. She is not ill-appearing.   HENT:      Head: Normocephalic and atraumatic.      Right Ear: External ear normal.      Left Ear: External ear normal.      Nose: Nose normal.   Eyes:      Extraocular Movements: Extraocular movements intact.      Conjunctiva/sclera: Conjunctivae normal.   Neck:      Vascular: No carotid bruit or JVD.   Cardiovascular:      Rate and Rhythm: Normal rate and regular rhythm.      Heart sounds: Normal heart sounds. No murmur heard.  Pulmonary:      Effort: Pulmonary effort is normal.      Breath sounds: Normal breath sounds.   Abdominal:      General: Abdomen is flat.      Palpations: Abdomen is soft.   Musculoskeletal:      Cervical back: Neck supple.      Right lower leg: No edema.      Left lower leg: No edema.   Neurological:      General: No focal deficit present.      Mental Status: She is alert.   Psychiatric:         Mood and Affect: Mood normal.         Behavior: Behavior normal.       Administrative Statements

## 2024-07-31 DIAGNOSIS — E27.1 ADDISON'S DISEASE (HCC): ICD-10-CM

## 2024-07-31 DIAGNOSIS — G47.419 PRIMARY NARCOLEPSY WITHOUT CATAPLEXY: ICD-10-CM

## 2024-07-31 DIAGNOSIS — E06.3 HYPOTHYROIDISM DUE TO HASHIMOTO'S THYROIDITIS: Primary | ICD-10-CM

## 2024-07-31 DIAGNOSIS — E03.8 HYPOTHYROIDISM DUE TO HASHIMOTO'S THYROIDITIS: Primary | ICD-10-CM

## 2024-07-31 DIAGNOSIS — F32.9 REACTIVE DEPRESSION: ICD-10-CM

## 2024-07-31 DIAGNOSIS — I87.1 MAY-THURNER SYNDROME: ICD-10-CM

## 2024-08-01 RX ORDER — VENLAFAXINE HYDROCHLORIDE 37.5 MG/1
37.5 CAPSULE, EXTENDED RELEASE ORAL DAILY
Qty: 90 CAPSULE | Refills: 1 | Status: SHIPPED | OUTPATIENT
Start: 2024-08-01

## 2024-08-01 RX ORDER — DEXTROAMPHETAMINE SACCHARATE, AMPHETAMINE ASPARTATE MONOHYDRATE, DEXTROAMPHETAMINE SULFATE AND AMPHETAMINE SULFATE 7.5; 7.5; 7.5; 7.5 MG/1; MG/1; MG/1; MG/1
30 CAPSULE, EXTENDED RELEASE ORAL DAILY
Qty: 30 CAPSULE | Refills: 0 | Status: SHIPPED | OUTPATIENT
Start: 2024-08-01

## 2024-08-01 RX ORDER — PREDNISONE 5 MG/1
5 TABLET ORAL DAILY
Qty: 90 TABLET | Refills: 1 | Status: SHIPPED | OUTPATIENT
Start: 2024-08-01

## 2024-08-01 RX ORDER — DEXTROAMPHETAMINE SACCHARATE, AMPHETAMINE ASPARTATE, DEXTROAMPHETAMINE SULFATE AND AMPHETAMINE SULFATE 2.5; 2.5; 2.5; 2.5 MG/1; MG/1; MG/1; MG/1
10 TABLET ORAL 3 TIMES DAILY PRN
Qty: 90 TABLET | Refills: 0 | Status: SHIPPED | OUTPATIENT
Start: 2024-08-01 | End: 2024-08-31

## 2024-08-01 RX ORDER — FLUDROCORTISONE ACETATE 0.1 MG/1
0.2 TABLET ORAL DAILY
Qty: 180 TABLET | Refills: 1 | Status: SHIPPED | OUTPATIENT
Start: 2024-08-01

## 2024-08-01 RX ORDER — LEVOTHYROXINE SODIUM 88 UG/1
88 TABLET ORAL DAILY
Qty: 90 TABLET | Refills: 1 | Status: SHIPPED | OUTPATIENT
Start: 2024-08-01

## 2024-08-06 ENCOUNTER — TELEPHONE (OUTPATIENT)
Dept: FAMILY MEDICINE CLINIC | Facility: CLINIC | Age: 49
End: 2024-08-06

## 2024-08-07 NOTE — TELEPHONE ENCOUNTER
PA for Fludrocortisone Acetate 0.1MG tablets  SUBMITTED     via    [x][id number]   []Surescripts-Case ID #   []Faxed to plan   []Other website   []Phone call Case ID #     Office notes sent, clinical questions answered. Awaiting determination    Turnaround time for your insurance to make a decision on your Prior Authorization can take 7-21 business days.

## 2024-08-07 NOTE — TELEPHONE ENCOUNTER
PA for Fludrocortisone Acetate 0.1MG tablets Approved     Date(s) approved 08/06/2024    Case #[id number]    Patient advised by          [x] [employer] Message  [x] Phone call   []LMOM  []L/M to call office as no active Communication consent on file  []Unable to leave detailed message as VM not approved on Communication consent       Pharmacy advised by    [x]Fax  []Phone call    Approval letter scanned into Media Yes

## 2024-09-06 DIAGNOSIS — G47.419 PRIMARY NARCOLEPSY WITHOUT CATAPLEXY: ICD-10-CM

## 2024-09-06 DIAGNOSIS — E27.1 ADDISON'S DISEASE (HCC): ICD-10-CM

## 2024-09-06 DIAGNOSIS — I87.1 MAY-THURNER SYNDROME: ICD-10-CM

## 2024-09-06 RX ORDER — DEXTROAMPHETAMINE SACCHARATE, AMPHETAMINE ASPARTATE MONOHYDRATE, DEXTROAMPHETAMINE SULFATE AND AMPHETAMINE SULFATE 7.5; 7.5; 7.5; 7.5 MG/1; MG/1; MG/1; MG/1
30 CAPSULE, EXTENDED RELEASE ORAL DAILY
Qty: 90 CAPSULE | Refills: 0 | Status: SHIPPED | OUTPATIENT
Start: 2024-09-06

## 2024-09-06 RX ORDER — PREDNISONE 5 MG/1
5 TABLET ORAL DAILY
Qty: 90 TABLET | Refills: 2 | Status: SHIPPED | OUTPATIENT
Start: 2024-09-06

## 2024-09-06 NOTE — TELEPHONE ENCOUNTER
Reason for call:   [x] Refill   [] Prior Auth  [] Other:     Office:   [x] PCP/Provider - [facility]   [] Specialty/Provider -     amphetamine-dextroamphetamine (ADDERALL XR) 30 MG 24 hr capsule    30 mg, Daily   30      predniSONE 5 mg tablet    5 mg, Daily    90  Pharmacy: [facility]     Does the patient have enough for 3 days?   [] Yes   [x] No - Send as HP to POD

## 2024-09-16 DIAGNOSIS — G47.419 PRIMARY NARCOLEPSY WITHOUT CATAPLEXY: ICD-10-CM

## 2024-09-16 RX ORDER — DEXTROAMPHETAMINE SACCHARATE, AMPHETAMINE ASPARTATE, DEXTROAMPHETAMINE SULFATE AND AMPHETAMINE SULFATE 2.5; 2.5; 2.5; 2.5 MG/1; MG/1; MG/1; MG/1
10 TABLET ORAL 3 TIMES DAILY PRN
Qty: 90 TABLET | Refills: 0 | Status: SHIPPED | OUTPATIENT
Start: 2024-09-16 | End: 2024-10-16

## 2024-09-16 NOTE — TELEPHONE ENCOUNTER
Reason for call:   [x] Refill   [] Prior Auth  [] Other:     Office:   [x] PCP/Provider - Janette Valadez,   PCP - General  [] Specialty/Provider -     Medication:   amphetamine-dextroamphetamine (ADDERALL) 10 mg tablet () 10 mg, 3 times daily PRN       Pharmacy: [facility] - [place], [place] - [address]      Does the patient have enough for 3 days?   [] Yes   [x] No - Send as HP to POD

## 2024-09-17 ENCOUNTER — TELEPHONE (OUTPATIENT)
Dept: FAMILY MEDICINE CLINIC | Facility: CLINIC | Age: 49
End: 2024-09-17

## 2024-09-18 NOTE — TELEPHONE ENCOUNTER
PA for Amphetamine dextroamphetamine 10 mg SUBMITTED     via    []CMM-KEY:   [x]Monique-Case ID # [id number]   []Faxed to plan   []Other website   []Phone call Case ID #     Office notes sent, clinical questions answered. Awaiting determination    Turnaround time for your insurance to make a decision on your Prior Authorization can take 7-21 business days.

## 2024-09-18 NOTE — TELEPHONE ENCOUNTER
PA for amphetamine dextramphetamine 10 mg  APPROVED     Date(s) approved  September 18, 2024 to December 31, 2024     Case #[id number]     Patient advised by          [x][employer] Message  []Phone call   [x]LMOM  []L/M to call office as no active Communication consent on file  []Unable to leave detailed message as VM not approved on Communication consent       Pharmacy advised by    [x]Fax  []Phone call    Approval letter scanned into Media yes

## 2024-10-25 ENCOUNTER — TELEPHONE (OUTPATIENT)
Age: 49
End: 2024-10-25

## 2024-10-25 NOTE — TELEPHONE ENCOUNTER
Spouse called and was seeing if there was a referral for gyno.  She needs her annual exam.  They would like to see if her PCP can do this?  \[facility]\"" advise

## 2024-10-28 NOTE — TELEPHONE ENCOUNTER
Left message on the number provided which was the spouses number. In regards to the message of an annual gyno exam. I let him know that  does do annual exams or if she would like to go to a gyno a referral can be placed. He can call and let us know I would be more than happy to schedule

## 2024-11-04 DIAGNOSIS — G47.419 PRIMARY NARCOLEPSY WITHOUT CATAPLEXY: ICD-10-CM

## 2024-11-04 NOTE — TELEPHONE ENCOUNTER
Reason for call:   [x] Refill   [] Prior Auth  [] Other:     Office:   [x] PCP/Provider - spouse margareth- Janette Valadez DO [facility] [place] [facility]   [] Specialty/Provider -     Medication:   amphetamine-dextroamphetamine (ADDERALL) 10 mg tablet   Take 1 tablet (10 mg total) by mouth 3 (three) times a day as needed (narcolepsy    Pharmacy:   [facility] - [place], [place] - [address] [place] [address]      Does the patient have enough for 3 days?   [] Yes   [x] No - Send as HP to POD

## 2024-11-05 RX ORDER — DEXTROAMPHETAMINE SACCHARATE, AMPHETAMINE ASPARTATE, DEXTROAMPHETAMINE SULFATE AND AMPHETAMINE SULFATE 2.5; 2.5; 2.5; 2.5 MG/1; MG/1; MG/1; MG/1
10 TABLET ORAL 3 TIMES DAILY PRN
Qty: 90 TABLET | Refills: 0 | Status: SHIPPED | OUTPATIENT
Start: 2024-11-05 | End: 2024-12-05

## 2024-12-02 DIAGNOSIS — G47.419 PRIMARY NARCOLEPSY WITHOUT CATAPLEXY: ICD-10-CM

## 2024-12-02 DIAGNOSIS — E06.3 HYPOTHYROIDISM DUE TO HASHIMOTO'S THYROIDITIS: ICD-10-CM

## 2024-12-02 RX ORDER — LEVOTHYROXINE SODIUM 88 UG/1
88 TABLET ORAL DAILY
Qty: 30 TABLET | Refills: 0 | Status: SHIPPED | OUTPATIENT
Start: 2024-12-02

## 2024-12-02 RX ORDER — DEXTROAMPHETAMINE SACCHARATE, AMPHETAMINE ASPARTATE MONOHYDRATE, DEXTROAMPHETAMINE SULFATE AND AMPHETAMINE SULFATE 7.5; 7.5; 7.5; 7.5 MG/1; MG/1; MG/1; MG/1
30 CAPSULE, EXTENDED RELEASE ORAL DAILY
Qty: 90 CAPSULE | Refills: 0 | Status: SHIPPED | OUTPATIENT
Start: 2024-12-02 | End: 2024-12-11 | Stop reason: SDUPTHER

## 2024-12-02 NOTE — TELEPHONE ENCOUNTER
Reason for call:   [x] Refill   [] Prior Auth  [] Other:     Office:   [x] PCP/Provider - Dr Valadez  [] Specialty/Provider -     Medication:   Adderall 30 mg XR, 1 qd, 30  Levothyroxine 88 mcg, 1 qd, 90      Pharmacy:   [facility]    Does the patient have enough for 3 days?   [] Yes   [x] No - Send as HP to POD

## 2024-12-10 DIAGNOSIS — E27.1 ADDISON'S DISEASE (HCC): ICD-10-CM

## 2024-12-11 DIAGNOSIS — G47.419 PRIMARY NARCOLEPSY WITHOUT CATAPLEXY: ICD-10-CM

## 2024-12-11 RX ORDER — FLUDROCORTISONE ACETATE 0.1 MG/1
TABLET ORAL
Qty: 180 TABLET | Refills: 1 | Status: SHIPPED | OUTPATIENT
Start: 2024-12-11

## 2024-12-11 RX ORDER — DEXTROAMPHETAMINE SACCHARATE, AMPHETAMINE ASPARTATE MONOHYDRATE, DEXTROAMPHETAMINE SULFATE AND AMPHETAMINE SULFATE 7.5; 7.5; 7.5; 7.5 MG/1; MG/1; MG/1; MG/1
30 CAPSULE, EXTENDED RELEASE ORAL DAILY
Qty: 90 CAPSULE | Refills: 0 | Status: SHIPPED | OUTPATIENT
Start: 2024-12-11

## 2024-12-11 RX ORDER — DEXTROAMPHETAMINE SACCHARATE, AMPHETAMINE ASPARTATE, DEXTROAMPHETAMINE SULFATE AND AMPHETAMINE SULFATE 2.5; 2.5; 2.5; 2.5 MG/1; MG/1; MG/1; MG/1
10 TABLET ORAL 3 TIMES DAILY PRN
Qty: 90 TABLET | Refills: 0 | Status: SHIPPED | OUTPATIENT
Start: 2024-12-11 | End: 2025-01-10

## 2024-12-11 NOTE — TELEPHONE ENCOUNTER
Patient's  called in and states his wife was only given 20 capsules on 12/4 because the pharmacy didn't have enough and now she has run out and they want a new prescription. Please review and send in another prescription.    Reason for call:   [x] Refill   [] Prior Auth  [] Other:     Office:   [x] PCP/Provider -   [] Specialty/Provider -     Medication:     Adderall 10 mg tablet taken by mouth 3x daily as needed for narcolepsy #90 tabs     Adderall 30 mg 24 hr capsule taken by mouth once daily in the morning #90 tabs     Pharmacy: [facility] - [place], [place] - [address] [place] [address] [phone]     Does the patient have enough for 3 days?   [] Yes   [x] No - Send as HP to POD

## 2024-12-28 ENCOUNTER — NURSE TRIAGE (OUTPATIENT)
Dept: OTHER | Facility: OTHER | Age: 49
End: 2024-12-28

## 2024-12-28 DIAGNOSIS — F32.9 REACTIVE DEPRESSION: ICD-10-CM

## 2024-12-28 RX ORDER — VENLAFAXINE HYDROCHLORIDE 37.5 MG/1
37.5 CAPSULE, EXTENDED RELEASE ORAL DAILY
Qty: 7 CAPSULE | Refills: 0 | Status: SHIPPED | OUTPATIENT
Start: 2024-12-28

## 2024-12-28 NOTE — TELEPHONE ENCOUNTER
"Regarding: medication refill  ----- Message from Alex GONZALEZ sent at 12/28/2024 10:05 AM EST -----  \" My wife needs a refill of her venlafaxine, the pharmacy we usually use is closed.\"    "

## 2024-12-28 NOTE — TELEPHONE ENCOUNTER
Medication Refill Request     Name effexor   Dose/Frequency 37.5 QD  Quantity 90  Verified pharmacy   [x]  Verified ordering Provider   []  Does patient have enough for the next 3 days? Yes [x] No []

## 2024-12-28 NOTE — TELEPHONE ENCOUNTER
"Reason for Disposition  • [1] Prescription prescribed recently is not at pharmacy AND [2] triager has access to patient's EMR AND [3] prescription is recorded in the EMR    Answer Assessment - Initial Assessment Questions  1. DRUG NAME: \"What medicine do you need to have refilled?\"      Effexor 37.5mg    Out of med and pharmacy is closed.    Protocols used: Medication Refill and Renewal Call-Adult-      7 day emergency refill placed to requested pharmacy.  "

## 2024-12-29 DIAGNOSIS — F32.9 REACTIVE DEPRESSION: ICD-10-CM

## 2024-12-30 RX ORDER — VENLAFAXINE HYDROCHLORIDE 37.5 MG/1
37.5 CAPSULE, EXTENDED RELEASE ORAL DAILY
Qty: 90 CAPSULE | Refills: 1 | Status: SHIPPED | OUTPATIENT
Start: 2024-12-30

## 2024-12-30 RX ORDER — VENLAFAXINE HYDROCHLORIDE 37.5 MG/1
37.5 CAPSULE, EXTENDED RELEASE ORAL DAILY
Qty: 7 CAPSULE | Refills: 0 | Status: SHIPPED | OUTPATIENT
Start: 2024-12-30

## 2024-12-31 DIAGNOSIS — E06.3 HYPOTHYROIDISM DUE TO HASHIMOTO'S THYROIDITIS: ICD-10-CM

## 2025-01-02 RX ORDER — LEVOTHYROXINE SODIUM 88 UG/1
88 TABLET ORAL DAILY
Qty: 90 TABLET | Refills: 1 | Status: SHIPPED | OUTPATIENT
Start: 2025-01-02

## 2025-01-06 ENCOUNTER — TELEPHONE (OUTPATIENT)
Age: 50
End: 2025-01-06

## 2025-01-06 NOTE — TELEPHONE ENCOUNTER
Patient calling in regards to appointment today at 1:40pm.    Requesting virtual visit for today due to snow.    Please call patient back in regards.

## 2025-01-08 ENCOUNTER — TELEPHONE (OUTPATIENT)
Dept: ADMINISTRATIVE | Facility: OTHER | Age: 50
End: 2025-01-08

## 2025-01-08 NOTE — TELEPHONE ENCOUNTER
----- Message from Sheeba MIRANDA sent at 1/6/2025 10:04 AM EST -----  Regarding: Care Gap Request  01/06/25 10:04 AM    Hello, our patient No patient name on file. has had Mammogram completed/performed. Please assist in updating the patient chart by pulling the Care Everywhere (CE) document. The date of service is 09/2023.     Thank you,  ENZO Small [facility]     01/06/25 10:04 AM    Hello, our patient No patient name on file. has had HIV completed/performed. Please assist in updating the patient chart by pulling the Care Everywhere (CE) document. The date of service is 03/09/2019.     Thank you,  [facility]ZO [facility]

## 2025-01-09 NOTE — TELEPHONE ENCOUNTER
Mammogram: Upon review of the In Basket request we were able to note that no further action is required. The patient chart is up to date as a result of a previous request.      HIV: Upon review of the In Basket request we were able to locate, review, and update the patient chart as requested for HIV.    Any additional questions or concerns should be emailed to the Practice Liaisons via the appropriate education email address, please do not reply via In Basket.    Thank you  Susi Simmons MA   [facility]

## 2025-01-29 DIAGNOSIS — G47.419 PRIMARY NARCOLEPSY WITHOUT CATAPLEXY: ICD-10-CM

## 2025-01-29 NOTE — TELEPHONE ENCOUNTER
Reason for call:   [x] Refill   [] Prior Auth  [] Other:     Office:   [x] PCP/Provider - nora/damian  [] Specialty/Provider -     Medication:   amphetamine-dextroamphetamine (ADDERALL) 10 mg tablet        Dose/Frequency: Sig: Take 1 tablet (10 mg total) by mouth 3 (three) times a day as needed (narcolepsy) Max Daily Amount: 30 mg      Quantity: 90    Pharmacy: [facility] - [place] [id number]  Phone: [phone]  Fax: [phone]        Does the patient have enough for 3 days?   [] Yes   [x] No - Send as HP to POD

## 2025-01-30 RX ORDER — DEXTROAMPHETAMINE SACCHARATE, AMPHETAMINE ASPARTATE, DEXTROAMPHETAMINE SULFATE AND AMPHETAMINE SULFATE 2.5; 2.5; 2.5; 2.5 MG/1; MG/1; MG/1; MG/1
10 TABLET ORAL 3 TIMES DAILY PRN
Qty: 90 TABLET | Refills: 0 | Status: SHIPPED | OUTPATIENT
Start: 2025-01-30 | End: 2025-03-01

## 2025-01-31 ENCOUNTER — TELEPHONE (OUTPATIENT)
Dept: FAMILY MEDICINE CLINIC | Facility: CLINIC | Age: 50
End: 2025-01-31

## 2025-01-31 NOTE — TELEPHONE ENCOUNTER
Left VM for her in regards to medical insurance, patient only has medicare part A and that doesn't cover DrKamilahAppointments she has the option of self pay asked patient to call us back to update insurance

## 2025-02-10 DIAGNOSIS — G47.419 PRIMARY NARCOLEPSY WITHOUT CATAPLEXY: ICD-10-CM

## 2025-02-10 RX ORDER — DEXTROAMPHETAMINE SACCHARATE, AMPHETAMINE ASPARTATE MONOHYDRATE, DEXTROAMPHETAMINE SULFATE AND AMPHETAMINE SULFATE 7.5; 7.5; 7.5; 7.5 MG/1; MG/1; MG/1; MG/1
30 CAPSULE, EXTENDED RELEASE ORAL DAILY
Qty: 90 CAPSULE | Refills: 0 | Status: SHIPPED | OUTPATIENT
Start: 2025-02-10

## 2025-02-10 NOTE — TELEPHONE ENCOUNTER
Prescription was sent on 12/11/24 for 90 tabs the pharmacy only had 40  so she is out of medication now.     Reason for call:   [x] Refill   [] Prior Auth  [] Other:     Office:   [x] PCP/Provider - Benjamin  [] Specialty/Provider -     Medication: Amphetamine-dextroamphetamine XR 30mg    Dose/Frequency: 1 cap am    Quantity: 90    Pharmacy: [facility] - [address] [phone]     Does the patient have enough for 3 days?   [] Yes   [x] No - Send as HP to POD

## 2025-03-12 ENCOUNTER — OFFICE VISIT (OUTPATIENT)
Dept: FAMILY MEDICINE CLINIC | Facility: CLINIC | Age: 50
End: 2025-03-12
Payer: COMMERCIAL

## 2025-03-12 VITALS
WEIGHT: 150.79 LBS | OXYGEN SATURATION: 96 % | DIASTOLIC BLOOD PRESSURE: 64 MMHG | HEART RATE: 90 BPM | BODY MASS INDEX: 25.88 KG/M2 | SYSTOLIC BLOOD PRESSURE: 114 MMHG

## 2025-03-12 DIAGNOSIS — Z12.11 SCREENING FOR COLON CANCER: ICD-10-CM

## 2025-03-12 DIAGNOSIS — Z78.0 POSTMENOPAUSAL: ICD-10-CM

## 2025-03-12 DIAGNOSIS — M85.89 OSTEOPENIA OF MULTIPLE SITES: ICD-10-CM

## 2025-03-12 DIAGNOSIS — Z86.718 HISTORY OF DEEP VEIN THROMBOSIS (DVT) OF LOWER EXTREMITY: ICD-10-CM

## 2025-03-12 DIAGNOSIS — Z12.31 ENCOUNTER FOR SCREENING MAMMOGRAM FOR BREAST CANCER: ICD-10-CM

## 2025-03-12 DIAGNOSIS — E27.1 ADDISON'S DISEASE (HCC): ICD-10-CM

## 2025-03-12 DIAGNOSIS — Z13.6 SCREENING FOR CARDIOVASCULAR CONDITION: ICD-10-CM

## 2025-03-12 DIAGNOSIS — G47.419 PRIMARY NARCOLEPSY WITHOUT CATAPLEXY: ICD-10-CM

## 2025-03-12 DIAGNOSIS — E06.3 HYPOTHYROIDISM DUE TO HASHIMOTO'S THYROIDITIS: Primary | ICD-10-CM

## 2025-03-12 PROCEDURE — 99214 OFFICE O/P EST MOD 30 MIN: CPT | Performed by: FAMILY MEDICINE

## 2025-03-12 RX ORDER — FLUDROCORTISONE ACETATE 0.1 MG/1
0.2 TABLET ORAL DAILY
Qty: 180 TABLET | Refills: 1 | Status: SHIPPED | OUTPATIENT
Start: 2025-03-12

## 2025-03-12 NOTE — ASSESSMENT & PLAN NOTE
Patient reports history of osteopenia - on review of DEXA, it looks like she has osteoporosis.She may benefit from treatment or retesting at this time.   DEXA scan 2018: T-score results:  AP spine (L1-L4)   -2.7       Femoral neck ( Left) -2.4    Total hip (Left) -2.4     Repeat dexa previously ordered, need to complete  Will reorder again at this time            Orders:    DXA bone density spine hip and pelvis; Future    Vitamin D 25 hydroxy; Future

## 2025-03-12 NOTE — PROGRESS NOTES
Name: Radha Weiss      : 1975      MRN: [id number]  Encounter Provider: Janette Alexander DO  Encounter Date: 3/12/2025   Encounter department: [facility]  :  Assessment & Plan  Hypothyroidism due to Hashimoto's thyroiditis  Chronic, stable  Currently on 88mcg Levothyroxine daily, continue  Follows with Endo - Dr. Fabian        Orders:    CBC and differential; Future    TSH, 3rd generation with Free T4 reflex; Future    Comprehensive metabolic panel; Future    Aurora's disease (HCC)  Follows with endo  Continue Prednisone, Fludrocortisone, E-steroid  Mgmt per specialist    Orders:    fludrocortisone (FLORINEF) 0.1 mg tablet; Take 2 tablets (0.2 mg total) by mouth daily    CBC and differential; Future    Comprehensive metabolic panel; Future          Osteopenia of multiple sites  Patient reports history of osteopenia - on review of DEXA, it looks like she has osteoporosis.She may benefit from treatment or retesting at this time.   DEXA scan 2018: T-score results:  AP spine (L1-L4)   -2.7       Femoral neck ( Left) -2.4    Total hip (Left) -2.4     Repeat dexa previously ordered, need to complete  Will reorder again at this time            Orders:    DXA bone density spine hip and pelvis; Future    Vitamin D 25 hydroxy; Future    Primary narcolepsy without cataplexy  Taking Adderall.  States has tried several other medications including Provigil, and Nuvigil in the past.  Ineffective. Symptoms stable currently.  [place] pul[place] med [place] and needs to est here. Referral placed. Chart review consistent with this history reported. Continue Adderall    Orders:    Ambulatory Referral to Sleep Medicine; Future          History of deep vein thrombosis (DVT) of lower extremity  Documented h/o DVT with angioplasty and stent L groin-iliac. States hypercoagulability work up was completed and no underlying issues identified.  Thought to be related to an AVM and  being on OCPs.  Declines referral currently. Was on coumadin in the past, but now managed on asa 325mg daily. Continue.          Screening for colon cancer    Orders:    Cologuard    Encounter for screening mammogram for breast cancer    Orders:    Mammo screening bilateral w 3d and cad; Future    Postmenopausal    Orders:    DXA bone density spine hip and pelvis; Future    Screening for cardiovascular condition    Orders:    CBC and differential; Future    Comprehensive metabolic panel; Future    Lipid panel; Future      Return in about 6 months (around 9/12/2025) for Annual physical.       History of Present Illness   Chief Complaint   Patient presents with    Follow-up     6 month follow up        HPI  Patient presents for follow-up of her chronic health conditions.  Admittedly she has been putting her health and regular screenings on the back burner in the setting of her mother's declining Alzheimer's dementia.  She has no acute concerns at this time but will work on scheduling follow-ups and needed screening between now and her next visit with me.  We also discussed that she is overdue for some blood work to assess her thyroid function and she will get this done soon.     Review of Systems   Constitutional:  Negative for appetite change, chills, diaphoresis, fever and unexpected weight change.   Eyes:  Negative for visual disturbance.   Respiratory:  Negative for shortness of breath.    Cardiovascular:  Negative for chest pain and leg swelling.   Gastrointestinal:  Negative for constipation and diarrhea.   Endocrine: Negative for polydipsia and polyuria.   Genitourinary:  Negative for frequency.   Neurological:  Negative for dizziness, light-headedness and headaches.       Objective   /64 (BP Location: Right arm, Patient Position: Sitting, Cuff Size: Standard)   Pulse 90   Wt 68.4 kg (150 lb 12.7 oz)   SpO2 96%   BMI 25.88 kg/m²      Physical Exam  Vitals reviewed.   Constitutional:       General: She  is not in acute distress.     Appearance: She is normal weight. She is not ill-appearing.   HENT:      Head: Normocephalic and atraumatic.   Eyes:      Extraocular Movements: Extraocular movements intact.      Conjunctiva/sclera: Conjunctivae normal.   Cardiovascular:      Rate and Rhythm: Normal rate.   Pulmonary:      Effort: Pulmonary effort is normal.   Musculoskeletal:      Cervical back: Neck supple.   Skin:     General: Skin is warm.   Neurological:      Mental Status: She is alert.   Psychiatric:         Mood and Affect: Mood normal.         Behavior: Behavior normal.         Thought Content: Thought content normal.

## 2025-03-12 NOTE — ASSESSMENT & PLAN NOTE
Taking Adderall.  States has tried several other medications including Provigil, and Nuvigil in the past.  Ineffective. Symptoms stable currently.  [place] pulm/[place] med [place] and needs to est here. Referral placed. Chart review consistent with this history reported. Continue Adderall    Orders:    Ambulatory Referral to Sleep Medicine; Future

## 2025-03-12 NOTE — ASSESSMENT & PLAN NOTE
Follows with endo  Continue Prednisone, Fludrocortisone, E-steroid  Mgmt per specialist    Orders:    fludrocortisone (FLORINEF) 0.1 mg tablet; Take 2 tablets (0.2 mg total) by mouth daily    CBC and differential; Future    Comprehensive metabolic panel; Future

## 2025-03-12 NOTE — PATIENT INSTRUCTIONS
Schedule with sleep medicine for Narcolepsy  Schedule with Gyn with [facility] for updated pap screening  Mammo and DEXA scan ordered - call [phone] to schedule   Cologuard will be sent to your home   Labs ordered, fasting 8 hours, lab is open M-F 7a-3pm in our building

## 2025-03-12 NOTE — ASSESSMENT & PLAN NOTE
Chronic, stable  Currently on 88mcg Levothyroxine daily, continue  Follows with Fermin Fabian        Orders:    CBC and differential; Future    TSH, 3rd generation with Free T4 reflex; Future    Comprehensive metabolic panel; Future

## 2025-03-13 DIAGNOSIS — G47.419 PRIMARY NARCOLEPSY WITHOUT CATAPLEXY: ICD-10-CM

## 2025-03-13 RX ORDER — DEXTROAMPHETAMINE SACCHARATE, AMPHETAMINE ASPARTATE, DEXTROAMPHETAMINE SULFATE AND AMPHETAMINE SULFATE 2.5; 2.5; 2.5; 2.5 MG/1; MG/1; MG/1; MG/1
10 TABLET ORAL 3 TIMES DAILY PRN
Qty: 90 TABLET | Refills: 0 | Status: SHIPPED | OUTPATIENT
Start: 2025-03-13 | End: 2025-04-12

## 2025-03-13 NOTE — TELEPHONE ENCOUNTER
Reason for call:   [x] Refill   [] Prior Auth  [] Other:     Office:   [x] [facility]   [] Specialty/Provider -     Medication: Adderall    Dose/Frequency: 10 mg     Quantity: #90    Pharmacy: [facility]   Does the patient have enough for 3 days?   [] Yes   [x] No - Send as HP to POD    [facility] Away Pharmacy   Does the patient have enough for 10 days?   [] Yes   [] No - Send as HP to POD

## 2025-03-14 DIAGNOSIS — G47.419 PRIMARY NARCOLEPSY WITHOUT CATAPLEXY: ICD-10-CM

## 2025-03-14 RX ORDER — DEXTROAMPHETAMINE SACCHARATE, AMPHETAMINE ASPARTATE, DEXTROAMPHETAMINE SULFATE AND AMPHETAMINE SULFATE 2.5; 2.5; 2.5; 2.5 MG/1; MG/1; MG/1; MG/1
10 TABLET ORAL 3 TIMES DAILY PRN
Qty: 90 TABLET | Refills: 0 | OUTPATIENT
Start: 2025-03-14 | End: 2025-04-13

## 2025-03-14 NOTE — TELEPHONE ENCOUNTER
Pharmacy change   Patient needs to be change to [facility] not a duplicate     Reason for call:   [x] Refill   [] Prior Auth  [] Other:     Office:   [x] PCP/Provider - Janette Alexander  [] Specialty/Provider -     Medication: Amphetamine-Dextroamphetamine    Dose/Frequency: 10 mg TID PRN     Quantity: 90    Pharmacy: [facility] [place]    Local Pharmacy   Does the patient have enough for 3 days?   [] Yes   [x] No - Send as HP to POD    Mail Away Pharmacy   Does the patient have enough for 10 days?   [] Yes   [] No - Send as HP to POD

## 2025-03-18 ENCOUNTER — TELEPHONE (OUTPATIENT)
Dept: FAMILY MEDICINE CLINIC | Facility: CLINIC | Age: 50
End: 2025-03-18

## 2025-03-18 NOTE — TELEPHONE ENCOUNTER
Submitted prior authorization to [employer] and received the following response. Please speak to patient regarding her insurance coverage.  Prior authorization request has been cancelled. Thank you.

## 2025-03-21 NOTE — TELEPHONE ENCOUNTER
Patient called the RX Refill Line.  called for status of Prior Auth for Adderall. Informed  that per notes dated 3/18/25 a voice message was left for his wife. Instructed  if any further assistance is required to call back anytime.

## 2025-03-24 NOTE — TELEPHONE ENCOUNTER
Patient  calling again. States the amphetamine-dextroamphetamine needs to go thru medicare first. Please call patient  at [phone] to discuss.

## 2025-04-02 ENCOUNTER — OFFICE VISIT (OUTPATIENT)
Dept: ENDOCRINOLOGY | Facility: CLINIC | Age: 50
End: 2025-04-02
Payer: COMMERCIAL

## 2025-04-02 VITALS
HEIGHT: 64 IN | BODY MASS INDEX: 25.78 KG/M2 | SYSTOLIC BLOOD PRESSURE: 132 MMHG | DIASTOLIC BLOOD PRESSURE: 90 MMHG | HEART RATE: 102 BPM | WEIGHT: 151 LBS | OXYGEN SATURATION: 99 % | TEMPERATURE: 98 F

## 2025-04-02 DIAGNOSIS — E31.0 POLYGLANDULAR AUTOIMMUNE SYNDROME, TYPE 2 (HCC): ICD-10-CM

## 2025-04-02 DIAGNOSIS — E28.39 PRIMARY OVARIAN FAILURE: ICD-10-CM

## 2025-04-02 DIAGNOSIS — Z86.718 HISTORY OF DEEP VEIN THROMBOSIS (DVT) OF LOWER EXTREMITY: ICD-10-CM

## 2025-04-02 DIAGNOSIS — Z78.0 POST-MENOPAUSAL: ICD-10-CM

## 2025-04-02 DIAGNOSIS — M85.80 OSTEOPENIA, UNSPECIFIED LOCATION: ICD-10-CM

## 2025-04-02 DIAGNOSIS — E27.1 ADDISON'S DISEASE (HCC): Primary | ICD-10-CM

## 2025-04-02 DIAGNOSIS — E06.3 HYPOTHYROIDISM DUE TO HASHIMOTO'S THYROIDITIS: ICD-10-CM

## 2025-04-02 PROCEDURE — 99214 OFFICE O/P EST MOD 30 MIN: CPT | Performed by: STUDENT IN AN ORGANIZED HEALTH CARE EDUCATION/TRAINING PROGRAM

## 2025-04-02 RX ORDER — HYDROCORTISONE SODIUM SUCCINATE 100 MG/2ML
100 INJECTION INTRAMUSCULAR; INTRAVENOUS AS NEEDED
Qty: 2 EACH | Refills: 1 | Status: SHIPPED | OUTPATIENT
Start: 2025-04-02

## 2025-04-02 NOTE — ASSESSMENT & PLAN NOTE
She is currently on prednisone 5 mg daily and fludrocortisone 0.2 mg daily. She reports no recent changes in dosing or symptoms such as salt craving, low blood pressure, or lightheadedness. The potential side effects of fludrocortisone, including fluid retention and potassium loss, were discussed. A prescription for Solu-Cortef will be provided for emergency use. Laboratory tests will be conducted to monitor her metabolic health, including cholesterol and blood sugar levels.    Follow-up  The patient will follow up in 6 months.    Orders:  •  Comprehensive metabolic panel  •  Renin Activity, Plasma  •  hydrocortisone (Solu-CORTEF) 100 mg SOLR; Inject 2 mL (100 mg total) into a catheter in a vein if needed (adrenal crisis)

## 2025-04-02 NOTE — ASSESSMENT & PLAN NOTE
She is on levothyroxine 88 mcg daily. Her thyroid hormone levels will be monitored to ensure appropriate dosing. Laboratory tests will be conducted to check her thyroid hormone levels.    Orders:  •  Lipid Panel with Direct LDL reflex  •  TSH, 3rd generation  •  T4, free

## 2025-04-02 NOTE — PROGRESS NOTES
Name: Radha Weiss      : 1975      MRN: [id number]  Encounter Provider: Guzman Fabian DO  Encounter Date: 2025   Encounter department: [facility]    No chief complaint on file.  :  Assessment & Plan  Jerauld's disease (HCC)  She is currently on prednisone 5 mg daily and fludrocortisone 0.2 mg daily. She reports no recent changes in dosing or symptoms such as salt craving, low blood pressure, or lightheadedness. The potential side effects of fludrocortisone, including fluid retention and potassium loss, were discussed. A prescription for Solu-Cortef will be provided for emergency use. Laboratory tests will be conducted to monitor her metabolic health, including cholesterol and blood sugar levels.    Follow-up  The patient will follow up in 6 months.    Orders:  •  Comprehensive metabolic panel  •  Renin Activity, Plasma  •  hydrocortisone (Solu-CORTEF) 100 mg SOLR; Inject 2 mL (100 mg total) into a catheter in a vein if needed (adrenal crisis)    Primary ovarian failure         History of deep vein thrombosis (DVT) of lower extremity         Osteopenia, unspecified location         Hypothyroidism due to Hashimoto's thyroiditis  She is on levothyroxine 88 mcg daily. Her thyroid hormone levels will be monitored to ensure appropriate dosing. Laboratory tests will be conducted to check her thyroid hormone levels.    Orders:  •  Lipid Panel with Direct LDL reflex  •  TSH, 3rd generation  •  T4, free    Polyglandular autoimmune syndrome, type 2 (HCC)         Post-menopausal  She has a history of DVT on hormone replacement therapy. She is recommended Ob/gyn follow up. She is advised to schedule a bone density test (DEXA) at her convenience.    Orders:  •  Vitamin D 25 hydroxy        History of Present Illness   History of Present Illness  The patient, a 49-year-old female, presents for a routine follow-up at the endocrinology clinic. She has a history of  "polyglandular autoimmune syndrome type 2, complicated by Oldham's disease diagnosed at age 8, hypothyroidism secondary to Hashimoto's thyroiditis diagnosed at age 14, and premature ovarian failure, with a history of deep vein thrombosis (DVT) while on hormone replacement therapy.    The patient reports no significant health changes over the winter or the past year. She experiences persistent fatigue, which she attributes to her narcolepsy, hypersomnia, Horace's disease, and hypothyroidism. She has not had any episodes necessitating a change in her steroid dosage since her last visit. She does not experience salt cravings, hypotension, or lightheadedness. She is able to ambulate without difficulty and reports no pedal edema. Her medication regimen has included hydrocortisone, which was temporarily switched to prednisone before reverting to hydrocortisone. She carries a wallet card for emergency identification and had intramuscular treatment at home, which she believes has . She recalls using dexamethasone and Solu-Cortef in the past. Her current medication regimen includes prednisone 5 mg daily and fludrocortisone 0.1 mg twice daily.    The patient reports myalgia. She is currently on levothyroxine 88 mcg daily.    She has not yet scheduled an appointment with an obstetrician-gynecologist (OB-GYN), despite a referral being sent.    MEDICATIONS  Current: Prednisone, fludrocortisone, levothyroxine.  Past: Hydrocortisone.    Pertinent Medical History     Oldham's disease - diagnosed age 8 years)  Hypothyroidism due to Hashimoto's - diagnosed age 14 years  Premature ovarian failure  History of DVT      Review of Systems as per HPI       Medical History Reviewed by provider this encounter:  Tobacco  Allergies  Meds  Problems  Med Hx  Surg Hx  Fam Hx     .    Objective   /90 (BP Location: Left arm, Patient Position: Sitting)   Pulse 102   Temp 98 °F (36.7 °C)   Ht 5' 4\" (1.626 m)   Wt 68.5 kg " "(151 lb)   SpO2 99%   BMI 25.92 kg/m²      Body mass index is 25.92 kg/m².  Wt Readings from Last 3 Encounters:   04/02/25 68.5 kg (151 lb)   03/12/25 68.4 kg (150 lb 12.7 oz)   01/06/25 68 kg (150 lb)     Physical Exam  Vitals reviewed.   Constitutional:       General: She is not in acute distress.     Appearance: Normal appearance.   HENT:      Head: Normocephalic and atraumatic.   Eyes:      General: No scleral icterus.     Conjunctiva/sclera: Conjunctivae normal.   Pulmonary:      Effort: Pulmonary effort is normal. No respiratory distress.   Musculoskeletal:         General: No deformity.      Cervical back: Normal range of motion.   Neurological:      General: No focal deficit present.      Mental Status: She is alert.   Psychiatric:         Mood and Affect: Mood normal.         Behavior: Behavior normal.       Physical Exam      Results    Labs:   No results found for: \"HGBA1C\"  No results found for: \"CREATININE\", \"BUN\", \"NA\", \"K\", \"CL\", \"CO2\"  No results found for: \"EGFR\"  No results found for: \"CHOL\", \"HDL\", \"LDL\", \"TRIG\", \"CHOLHDL\"  No results found for: \"ALT\", \"AST\", \"GGT\", \"ALKPHOS\", \"BILITOT\"  No results found for: \"[id number]\"  No results found for: \"FREET4\", \"TSI\"    Patient Instructions   You can schedule your bone density test (DXA) at any time. To schedule, call central scheduling at [phone]    Labs at your convenience. I will follow up with you.     Discussed with the patient and all questioned fully answered. She will call me if any problems arise.      "

## 2025-04-02 NOTE — PATIENT INSTRUCTIONS
You can schedule your bone density test (DXA) at any time. To schedule, call central scheduling at [phone]    Labs at your convenience. I will follow up with you.

## 2025-04-07 DIAGNOSIS — G47.419 PRIMARY NARCOLEPSY WITHOUT CATAPLEXY: ICD-10-CM

## 2025-04-07 NOTE — TELEPHONE ENCOUNTER
Reason for call:   [x] Refill   [] Prior Auth  [] Other:     Office:   [x] PCP/Provider - [facility]/ DO Benjamin  [] Specialty/Provider -     Medication: amphetamine-dextroamphetamine (ADDERALL XR) 30 MG 24 hr capsule     Dose/Frequency: Take 1 capsule (30 mg total) by mouth in the morning Max Daily Amount: 30 mg    Quantity: 30    Pharmacy: [facility] - [address] [phone]    Local Pharmacy   Does the patient have enough for 3 days?   [] Yes   [x] No - Send as HP to POD    Mail Away Pharmacy   Does the patient have enough for 10 days?   [] Yes   [] No - Send as HP to POD

## 2025-04-08 RX ORDER — DEXTROAMPHETAMINE SACCHARATE, AMPHETAMINE ASPARTATE MONOHYDRATE, DEXTROAMPHETAMINE SULFATE AND AMPHETAMINE SULFATE 7.5; 7.5; 7.5; 7.5 MG/1; MG/1; MG/1; MG/1
30 CAPSULE, EXTENDED RELEASE ORAL DAILY
Qty: 90 CAPSULE | Refills: 0 | Status: SHIPPED | OUTPATIENT
Start: 2025-04-08 | End: 2025-04-14 | Stop reason: SDUPTHER

## 2025-04-14 ENCOUNTER — TELEPHONE (OUTPATIENT)
Age: 50
End: 2025-04-14

## 2025-04-14 DIAGNOSIS — G47.419 PRIMARY NARCOLEPSY WITHOUT CATAPLEXY: ICD-10-CM

## 2025-04-14 RX ORDER — DEXTROAMPHETAMINE SACCHARATE, AMPHETAMINE ASPARTATE, DEXTROAMPHETAMINE SULFATE AND AMPHETAMINE SULFATE 2.5; 2.5; 2.5; 2.5 MG/1; MG/1; MG/1; MG/1
10 TABLET ORAL 3 TIMES DAILY PRN
Qty: 90 TABLET | Refills: 0 | Status: SHIPPED | OUTPATIENT
Start: 2025-04-14 | End: 2025-05-14

## 2025-04-14 RX ORDER — DEXTROAMPHETAMINE SACCHARATE, AMPHETAMINE ASPARTATE MONOHYDRATE, DEXTROAMPHETAMINE SULFATE AND AMPHETAMINE SULFATE 7.5; 7.5; 7.5; 7.5 MG/1; MG/1; MG/1; MG/1
30 CAPSULE, EXTENDED RELEASE ORAL DAILY
Qty: 30 CAPSULE | Refills: 0 | Status: SHIPPED | OUTPATIENT
Start: 2025-04-14

## 2025-04-14 NOTE — TELEPHONE ENCOUNTER
Pt called to let us know a PA is reqd for this refill. Pt is asking what she can do while waiting for the PA to go through, she states she is not able to work without this medication.    Please advise

## 2025-04-14 NOTE — TELEPHONE ENCOUNTER
PA for amphetamine-dextroamphetamine 30MG 24hr capsule DENIED    Reason:        Message sent to office clinical pool Yes    Denial letter scanned into Media Yes    Appeal started No   **Please follow up with your patient regarding denial and next steps**

## 2025-04-14 NOTE — TELEPHONE ENCOUNTER
Reason for call:   [x] Prior Auth  [] Other:     Caller:  [x] Patient  [] Pharmacy  Name:   Address:   Callback Number:     Medication:  amphetamine-dextroamphetamine (ADDERALL XR) 30 MG 24 hr capsule    Dose/Frequency: Take 1 capsule (30 mg total) by mouth in the morning Max Daily Amount: 30 mg     Quantity: 90 capsule     Ordering Provider:   [x] PCP/Provider - Janette Alexander, DO   [] Speciality/Provider -     Has the patient tried other medications and failed? If failed, which medications did they fail?    [] No   [] Yes -     Is the patient's insurance updated in EPIC?   [x] Yes   [] No     Is a copy of the patient's insurance scanned in EPIC?   [x] Yes   [] No

## 2025-04-14 NOTE — TELEPHONE ENCOUNTER
Please see the denial letter in the chart RX needs to be 30 days  the insurance will only cover 30 days of controlled subextroamphetamine (ADDERALL XR) 30 MG 24 hr capsule

## 2025-04-14 NOTE — TELEPHONE ENCOUNTER
PA for amphetamine-dextroamphetamine 30MG 24hr capsule SUBMITTED to [facility]     via     [x]CMM-KEY: [id number]  []Surescripts-Case ID #   []Availity-Auth ID # NDC #   []Faxed to plan   []Other website   []Phone call Case ID #      [x]PA sent as URGENT     All office notes, labs and other pertaining documents and studies sent. Clinical questions answered. Awaiting determination from insurance company.      Turnaround time for your insurance to make a decision on your Prior Authorization can take 7-21 business days.

## 2025-04-15 ENCOUNTER — TELEPHONE (OUTPATIENT)
Dept: FAMILY MEDICINE CLINIC | Facility: CLINIC | Age: 50
End: 2025-04-15

## 2025-04-15 NOTE — TELEPHONE ENCOUNTER
Received covermymeds prior auth request for amphetamine- dextroamphet ER 30mg     Key [id number]  Last Name : Abhishek   [birth year]

## 2025-04-15 NOTE — TELEPHONE ENCOUNTER
Patient's  said this medication is a life sustaining medication and he would like to apeal the insurance's denial

## 2025-04-15 NOTE — TELEPHONE ENCOUNTER
Duplicate encounter created, please see telephone encounter from 04/14/2025 regarding Amphetamine Dextroamphetamine 30MG PA status. Please review patient's chart to see if there is already an encounter regarding the medication in question and to document anything regarding this medication in regards to anything regarding the authorization process etc before creating another encounter Thank You.

## 2025-04-16 NOTE — TELEPHONE ENCOUNTER
There are two primary indication for use of ADDERALL. 1. ADHD, 2. Narcolepsy  This patient has a condition of Narcolepsy and has tried several other medications including Provigil, and Nuvigil in the past which were ineffective in managing her condition. She was placed on Adderall for treatment by sleep medicine and has been stable and well maintained on this regimen. Her current drug regimen is   Adderall XR 30mg daily  Adderall 10mg TIC, prn     It is my recommendation patient maintain on this drug regimen.     DO Eileen Shields [facility]

## 2025-04-21 ENCOUNTER — HOSPITAL ENCOUNTER (OUTPATIENT)
Dept: RADIOLOGY | Facility: CLINIC | Age: 50
Discharge: HOME | End: 2025-04-21
Payer: COMMERCIAL

## 2025-04-21 VITALS — WEIGHT: 153 LBS | HEIGHT: 64 IN | BODY MASS INDEX: 26.12 KG/M2

## 2025-04-21 VITALS — HEIGHT: 65 IN | BODY MASS INDEX: 24.79 KG/M2 | WEIGHT: 148.8 LBS

## 2025-04-21 DIAGNOSIS — M85.89 OSTEOPENIA OF MULTIPLE SITES: ICD-10-CM

## 2025-04-21 DIAGNOSIS — Z12.31 ENCOUNTER FOR SCREENING MAMMOGRAM FOR BREAST CANCER: ICD-10-CM

## 2025-04-21 DIAGNOSIS — Z78.0 POSTMENOPAUSAL: ICD-10-CM

## 2025-04-21 PROCEDURE — 77067 SCR MAMMO BI INCL CAD: CPT

## 2025-04-21 PROCEDURE — 77063 BREAST TOMOSYNTHESIS BI: CPT

## 2025-04-21 PROCEDURE — 77080 DXA BONE DENSITY AXIAL: CPT

## 2025-04-23 NOTE — TELEPHONE ENCOUNTER
Patient calling to inform office she received a letter that her prescription for amphetamine-dextroamphetamine (ADDERALL XR) 30 MG 24 hr capsule was denied by insurance. Patient wanted to ask Primary Care Provider what her next steps should be. Warm transferred to office clinical who advised to leave message for Primary Care Provider to contact patient when available. Patient telephone number is [phone].

## 2025-04-24 NOTE — TELEPHONE ENCOUNTER
Contacted patients prescription plan who verified the 30mg was denied and an appeal needs to be done for the 10mg. The patient must contact them to find out what alternatives are available on her plan; they would not provide us this information.

## 2025-04-25 ENCOUNTER — RESULTS FOLLOW-UP (OUTPATIENT)
Dept: ENDOCRINOLOGY | Facility: CLINIC | Age: 50
End: 2025-04-25

## 2025-04-29 DIAGNOSIS — G47.419 PRIMARY NARCOLEPSY WITHOUT CATAPLEXY: ICD-10-CM

## 2025-04-29 RX ORDER — DEXTROAMPHETAMINE SACCHARATE, AMPHETAMINE ASPARTATE MONOHYDRATE, DEXTROAMPHETAMINE SULFATE AND AMPHETAMINE SULFATE 7.5; 7.5; 7.5; 7.5 MG/1; MG/1; MG/1; MG/1
30 CAPSULE, EXTENDED RELEASE ORAL DAILY
Qty: 30 CAPSULE | Refills: 0 | Status: SHIPPED | OUTPATIENT
Start: 2025-04-29

## 2025-04-29 RX ORDER — DEXTROAMPHETAMINE SACCHARATE, AMPHETAMINE ASPARTATE, DEXTROAMPHETAMINE SULFATE AND AMPHETAMINE SULFATE 2.5; 2.5; 2.5; 2.5 MG/1; MG/1; MG/1; MG/1
10 TABLET ORAL 3 TIMES DAILY PRN
Qty: 90 TABLET | Refills: 0 | Status: SHIPPED | OUTPATIENT
Start: 2025-04-29 | End: 2025-05-29

## 2025-04-29 NOTE — TELEPHONE ENCOUNTER
Reason for call:   [x] Refill   [] Prior Auth  [] Other:     Office:   [x] PCP/Provider - Benjamin  [] Specialty/Provider -     Medication:   Adderall 30 mg XR, 1 qd, 30  Adderall 10 mg, 1 tid, 90    Pharmacy:   [facility]    Local Pharmacy   Does the patient have enough for 3 days?   [] Yes   [x] No - Send as HP to POD    Mail Away Pharmacy   Does the patient have enough for 10 days?   [] Yes   [] No - Send as HP to POD

## 2025-04-30 ENCOUNTER — APPOINTMENT (OUTPATIENT)
Dept: LAB | Facility: CLINIC | Age: 50
End: 2025-04-30
Payer: COMMERCIAL

## 2025-04-30 DIAGNOSIS — M85.89 OSTEOPENIA OF MULTIPLE SITES: ICD-10-CM

## 2025-04-30 DIAGNOSIS — E06.3 HYPOTHYROIDISM DUE TO HASHIMOTO'S THYROIDITIS: ICD-10-CM

## 2025-04-30 DIAGNOSIS — E27.1 ADDISON'S DISEASE (HCC): ICD-10-CM

## 2025-04-30 DIAGNOSIS — Z13.6 SCREENING FOR CARDIOVASCULAR CONDITION: ICD-10-CM

## 2025-04-30 LAB
25(OH)D3 SERPL-MCNC: 38 NG/ML (ref 30–100)
ALBUMIN SERPL BCG-MCNC: 4.1 G/DL (ref 3.5–5)
ALP SERPL-CCNC: 88 U/L (ref 34–104)
ALT SERPL W P-5'-P-CCNC: 16 U/L (ref 7–52)
ANION GAP SERPL CALCULATED.3IONS-SCNC: 6 MMOL/L (ref 4–13)
AST SERPL W P-5'-P-CCNC: 20 U/L (ref 13–39)
BASOPHILS # BLD AUTO: 0.08 THOUSANDS/ÂΜL (ref 0–0.1)
BASOPHILS NFR BLD AUTO: 1 % (ref 0–1)
BILIRUB SERPL-MCNC: 0.4 MG/DL (ref 0.2–1)
BUN SERPL-MCNC: 15 MG/DL (ref 5–25)
CALCIUM SERPL-MCNC: 9.5 MG/DL (ref 8.4–10.2)
CHLORIDE SERPL-SCNC: 103 MMOL/L (ref 96–108)
CHOLEST SERPL-MCNC: 202 MG/DL (ref ?–200)
CO2 SERPL-SCNC: 30 MMOL/L (ref 21–32)
CREAT SERPL-MCNC: 0.88 MG/DL (ref 0.6–1.3)
EOSINOPHIL # BLD AUTO: 0.19 THOUSAND/ÂΜL (ref 0–0.61)
EOSINOPHIL NFR BLD AUTO: 3 % (ref 0–6)
ERYTHROCYTE [DISTWIDTH] IN BLOOD BY AUTOMATED COUNT: 12.7 % (ref 11.6–15.1)
GFR SERPL CREATININE-BSD FRML MDRD: 78 ML/MIN/1.73SQ M
GLUCOSE P FAST SERPL-MCNC: 87 MG/DL (ref 65–99)
HCT VFR BLD AUTO: 45.9 % (ref 34.8–46.1)
HDLC SERPL-MCNC: 78 MG/DL
HGB BLD-MCNC: 15 G/DL (ref 11.5–15.4)
IMM GRANULOCYTES # BLD AUTO: 0 THOUSAND/UL (ref 0–0.2)
IMM GRANULOCYTES NFR BLD AUTO: 0 % (ref 0–2)
LDLC SERPL CALC-MCNC: 111 MG/DL (ref 0–100)
LYMPHOCYTES # BLD AUTO: 2.72 THOUSANDS/ÂΜL (ref 0.6–4.47)
LYMPHOCYTES NFR BLD AUTO: 49 % (ref 14–44)
MCH RBC QN AUTO: 29.2 PG (ref 26.8–34.3)
MCHC RBC AUTO-ENTMCNC: 32.7 G/DL (ref 31.4–37.4)
MCV RBC AUTO: 89 FL (ref 82–98)
MONOCYTES # BLD AUTO: 0.53 THOUSAND/ÂΜL (ref 0.17–1.22)
MONOCYTES NFR BLD AUTO: 9 % (ref 4–12)
NEUTROPHILS # BLD AUTO: 2.17 THOUSANDS/ÂΜL (ref 1.85–7.62)
NEUTS SEG NFR BLD AUTO: 38 % (ref 43–75)
NRBC BLD AUTO-RTO: 0 /100 WBCS
PLATELET # BLD AUTO: 304 THOUSANDS/UL (ref 149–390)
PMV BLD AUTO: 9 FL (ref 8.9–12.7)
POTASSIUM SERPL-SCNC: 4.3 MMOL/L (ref 3.5–5.3)
PROT SERPL-MCNC: 6.5 G/DL (ref 6.4–8.4)
RBC # BLD AUTO: 5.14 MILLION/UL (ref 3.81–5.12)
SODIUM SERPL-SCNC: 139 MMOL/L (ref 135–147)
T4 FREE SERPL-MCNC: 1.16 NG/DL (ref 0.61–1.12)
TRIGL SERPL-MCNC: 64 MG/DL (ref ?–150)
TSH SERPL DL<=0.05 MIU/L-ACNC: 0.17 UIU/ML (ref 0.45–4.5)
WBC # BLD AUTO: 5.69 THOUSAND/UL (ref 4.31–10.16)

## 2025-04-30 PROCEDURE — 85025 COMPLETE CBC W/AUTO DIFF WBC: CPT

## 2025-04-30 PROCEDURE — 84443 ASSAY THYROID STIM HORMONE: CPT

## 2025-05-01 ENCOUNTER — RESULTS FOLLOW-UP (OUTPATIENT)
Dept: ENDOCRINOLOGY | Facility: CLINIC | Age: 50
End: 2025-05-01

## 2025-05-01 DIAGNOSIS — E06.3 HYPOTHYROIDISM DUE TO HASHIMOTO'S THYROIDITIS: Primary | ICD-10-CM

## 2025-05-01 RX ORDER — LEVOTHYROXINE SODIUM 75 UG/1
75 TABLET ORAL DAILY
Qty: 90 TABLET | Refills: 1 | Status: SHIPPED | OUTPATIENT
Start: 2025-05-01

## 2025-05-01 NOTE — TELEPHONE ENCOUNTER
----- Message from Guzman Fabian DO sent at 5/1/2025 10:32 AM EDT -----  Can Radha be made aware that her labs show hyperthyroidism, which indicates overtreatment with levothyroxine. Since hyperthyroidism can effect health negatively, a reduction of her levothyroxine dosing is being suggested with the goal of normalizing her TSH. I am writing for a reduced dose of levothyroxine 75 mcg daily and follow up thyroid labs for monitoring change in dosing in 2-3 months.

## 2025-05-05 LAB — RENIN PLAS-CCNC: 2.52 NG/ML/HR (ref 0.17–5.38)

## 2025-05-10 LAB — COLOGUARD RESULT REPORTABLE: NEGATIVE

## 2025-05-12 DIAGNOSIS — G47.419 PRIMARY NARCOLEPSY WITHOUT CATAPLEXY: ICD-10-CM

## 2025-05-14 RX ORDER — DEXTROAMPHETAMINE SACCHARATE, AMPHETAMINE ASPARTATE MONOHYDRATE, DEXTROAMPHETAMINE SULFATE AND AMPHETAMINE SULFATE 7.5; 7.5; 7.5; 7.5 MG/1; MG/1; MG/1; MG/1
30 CAPSULE, EXTENDED RELEASE ORAL DAILY
Qty: 30 CAPSULE | Refills: 0 | Status: SHIPPED | OUTPATIENT
Start: 2025-05-14

## 2025-05-28 DIAGNOSIS — G47.419 PRIMARY NARCOLEPSY WITHOUT CATAPLEXY: ICD-10-CM

## 2025-05-28 RX ORDER — DEXTROAMPHETAMINE SACCHARATE, AMPHETAMINE ASPARTATE, DEXTROAMPHETAMINE SULFATE AND AMPHETAMINE SULFATE 2.5; 2.5; 2.5; 2.5 MG/1; MG/1; MG/1; MG/1
10 TABLET ORAL 3 TIMES DAILY PRN
Qty: 90 TABLET | Refills: 0 | Status: SHIPPED | OUTPATIENT
Start: 2025-05-28 | End: 2025-06-27

## 2025-05-28 NOTE — TELEPHONE ENCOUNTER
Reason for call:   [x] Refill   [] Prior Auth  [] Other:     Office:   [x] PCP/Provider - Janette Alexander  [] Specialty/Provider -     Medication: Adderall     Dose/Frequency: 10 mg TID PRN    Quantity: 90    [facility]acy: [facility]   Does the patient have enough for 3 days?   [] Yes   [x] No - Send as HP to POD    Mail Away Pharmacy   Does the patient have enough for 10 days?   [] Yes   [] No - Send as HP to POD

## 2025-06-03 ENCOUNTER — TELEPHONE (OUTPATIENT)
Dept: UROLOGY | Facility: CLINIC | Age: 50
End: 2025-06-03

## 2025-06-03 NOTE — TELEPHONE ENCOUNTER
Milton for pt to call and let us know what her insur is.  The insur we have on file [place] is no longer active,

## 2025-06-16 DIAGNOSIS — G47.419 PRIMARY NARCOLEPSY WITHOUT CATAPLEXY: ICD-10-CM

## 2025-06-16 RX ORDER — DEXTROAMPHETAMINE SACCHARATE, AMPHETAMINE ASPARTATE MONOHYDRATE, DEXTROAMPHETAMINE SULFATE AND AMPHETAMINE SULFATE 7.5; 7.5; 7.5; 7.5 MG/1; MG/1; MG/1; MG/1
30 CAPSULE, EXTENDED RELEASE ORAL DAILY
Qty: 30 CAPSULE | Refills: 0 | Status: SHIPPED | OUTPATIENT
Start: 2025-06-16

## 2025-06-16 NOTE — TELEPHONE ENCOUNTER
Reason for call:   [x] Refill   [] Prior Auth  [] Other:     Office:   [x] PCP/Provider - Janette Alexander / [facility]   [] Specialty/Provider -     Medication: amphetamine-dextroamphetamine (ADDERALL XR) 30 MG 24 hr capsule     Dose/Frequency: Take 1 capsule (30 mg total) by mouth in the morning     Quantity: 30    Pharmacy:  [facility] - [address]   Does the patient have enough for 3 days?   [] Yes   [x] No - Send as HP to POD    Mail Away Pharmacy   Does the patient have enough for 10 days?   [] Yes   [] No - Send as HP to POD

## 2025-07-03 DIAGNOSIS — G47.419 PRIMARY NARCOLEPSY WITHOUT CATAPLEXY: ICD-10-CM

## 2025-07-03 DIAGNOSIS — E06.3 HYPOTHYROIDISM DUE TO HASHIMOTO'S THYROIDITIS: ICD-10-CM

## 2025-07-03 RX ORDER — DEXTROAMPHETAMINE SACCHARATE, AMPHETAMINE ASPARTATE, DEXTROAMPHETAMINE SULFATE AND AMPHETAMINE SULFATE 2.5; 2.5; 2.5; 2.5 MG/1; MG/1; MG/1; MG/1
10 TABLET ORAL 3 TIMES DAILY PRN
Qty: 90 TABLET | Refills: 0 | Status: SHIPPED | OUTPATIENT
Start: 2025-07-03 | End: 2025-08-02

## 2025-07-03 NOTE — TELEPHONE ENCOUNTER
Reason for call:   [x] Refill   [] Prior Auth  [] Other:     Office:   [x] PCP/Provider -   [] Specialty/Provider -     Medication: amphetamine-dextroamphetamine (ADDERALL) 10 mg tablet Take 1 tablet (10 mg total) by mouth 3 (three) times a day as needed (narcolepsy)       Pharmacy: [facility] - [place], [place] - [address] [facility]   Does the patient have enough for 3 days?   [] Yes   [x] No - Send as HP to POD    Mail Away Pharmacy   Does the patient have enough for 10 days?   [] Yes   [] No - Send as HP to POD

## 2025-07-07 RX ORDER — LEVOTHYROXINE SODIUM 88 UG/1
88 TABLET ORAL DAILY
Qty: 90 TABLET | Refills: 1 | OUTPATIENT
Start: 2025-07-07

## 2025-07-14 DIAGNOSIS — F32.9 REACTIVE DEPRESSION: ICD-10-CM

## 2025-07-15 DIAGNOSIS — G47.419 PRIMARY NARCOLEPSY WITHOUT CATAPLEXY: ICD-10-CM

## 2025-07-15 RX ORDER — DEXTROAMPHETAMINE SACCHARATE, AMPHETAMINE ASPARTATE MONOHYDRATE, DEXTROAMPHETAMINE SULFATE AND AMPHETAMINE SULFATE 7.5; 7.5; 7.5; 7.5 MG/1; MG/1; MG/1; MG/1
30 CAPSULE, EXTENDED RELEASE ORAL DAILY
Qty: 30 CAPSULE | Refills: 0 | Status: SHIPPED | OUTPATIENT
Start: 2025-07-15

## 2025-07-16 RX ORDER — VENLAFAXINE HYDROCHLORIDE 37.5 MG/1
37.5 CAPSULE, EXTENDED RELEASE ORAL DAILY
Qty: 90 CAPSULE | Refills: 1 | Status: SHIPPED | OUTPATIENT
Start: 2025-07-16

## 2025-07-25 ENCOUNTER — APPOINTMENT (OUTPATIENT)
Dept: URGENT CARE | Facility: CLINIC | Age: 50
End: 2025-07-25
Payer: OTHER MISCELLANEOUS

## 2025-07-28 DIAGNOSIS — E27.1 ADDISON'S DISEASE (HCC): ICD-10-CM

## 2025-07-28 DIAGNOSIS — I87.1 MAY-THURNER SYNDROME: ICD-10-CM

## 2025-07-30 RX ORDER — PREDNISONE 5 MG/1
5 TABLET ORAL DAILY
Qty: 90 TABLET | Refills: 2 | Status: SHIPPED | OUTPATIENT
Start: 2025-07-30